# Patient Record
Sex: FEMALE | Race: OTHER | Employment: OTHER | URBAN - METROPOLITAN AREA
[De-identification: names, ages, dates, MRNs, and addresses within clinical notes are randomized per-mention and may not be internally consistent; named-entity substitution may affect disease eponyms.]

---

## 2019-07-19 ENCOUNTER — APPOINTMENT (OUTPATIENT)
Dept: PHYSICAL THERAPY | Age: 59
End: 2019-07-19
Payer: MEDICARE

## 2019-08-02 ENCOUNTER — HOSPITAL ENCOUNTER (OUTPATIENT)
Dept: PHYSICAL THERAPY | Age: 59
Discharge: HOME | End: 2019-08-02
Payer: MEDICARE

## 2019-08-02 PROCEDURE — 97110 THERAPEUTIC EXERCISES: CPT | Performed by: PHYSICAL THERAPIST

## 2019-08-02 PROCEDURE — 97161 PT EVAL LOW COMPLEX 20 MIN: CPT | Performed by: PHYSICAL THERAPIST

## 2019-08-02 NOTE — PROGRESS NOTES
[facility] at [address] [place] [address] Drive: [phone]   Fax: [phone]  PLAN OF CARE / STATEMENT OF MEDICAL NECESSITY FOR PHYSICAL THERAPY SERVICES  Patient Name: Cecilia Jenkins : 1960   Medical   Diagnosis: Bilateral knee pain [M25.561, M25.562] Treatment Diagnosis: Bilateral knee pain   Onset Date: Chronic, worse since January     Referral Source: Dallas Finn MD Start of Care [facility]): 2019   Prior Hospitalization: See medical history Provider #: [id number]   Prior Level of Function: Cleaning around house, difficulty with stairs   Comorbidities: OA, MS, DMII   Medications: Verified on Patient Summary List   The Plan of Care and following information is based on the information from the initial evaluation.   ===========================================================================================  Assessment / pisano information:  Cecilia Jenkins is a 61 y.o.  yo female presents with  bilateral knee pain secondary to patellofemoral OA (right greater than left). Long standing history of MS, which may affect her strength.     Patient will continue to benefit from skilled PT services to modify and progress therapeutic interventions, address functional mobility deficits, address ROM deficits, address strength deficits, analyze and address soft tissue restrictions, analyze and cue movement patterns, analyze and modify body mechanics/ergonomics and assess and modify postural abnormalities to attain remaining goals.     Pt instructed in HEP and will f/u in clinic for PT.  ===========================================================================================  Eval Complexity: History HIGH Complexity :3+ comorbidities / personal factors will impact the outcome/ POC ;  Examination  MEDIUM Complexity : 3 Standardized tests and measures addressing body structure, function, activity limitation and / or participation in recreation ; Presentation LOW Complexity : Stable, uncomplicated ;  Decision Making MEDIUM Complexity : FOTO score of 26-74; Overall Complexity LOW   Problem List: pain affecting function, decrease ROM, decrease strength, impaired gait/ balance, decrease ADL/ functional abilitiies, decrease activity tolerance, decrease flexibility/ joint mobility and decrease transfer abilities   Treatment Plan may include any combination of the following: Therapeutic exercise, Therapeutic activities, Neuromuscular re-education, Physical agent/modality, Gait/balance training, Manual therapy, Aquatic therapy, Patient education, Self Care training, Functional mobility training and Home safety training  Patient / Family readiness to learn indicated by: asking questions, trying to perform skills and interest  Persons(s) to be included in education: patient (P)  Barriers to Learning/Limitations: no  Patient Goal (s): Make legs stronger   Patient self reported health status: fair  Rehabilitation Potential: fair  Goals:  Short Term Goals: To be accomplished in 2 weeks:   Patient will report compliance with HEP at least 1x/day to aid in rehabilitation program.   Status at IE: NA   Current: Same as IE     Long Term Goals: To be accomplished in 6 weeks:   Patient will increase strength to 5/5 throughout B LEs to aid in completion of ADLs. Status at IE:4/5 in right LE   Current: Same as IE     Patient will report pain less than 5/10 average to aid in completion of ADLs. Status at IE:6/10   Current: Same as IE     Patient will perform 10 pain free squats with 10lbs with good form/technique to aid in completion of ADLs. Status at IE:uses UE for sit<>Stand   Current: Same as IE     Patient will improve FOTO to 51 points overall to demonstrate improvement in functional ability.    Status at IE:45   Current: Same as IE       Frequency / Duration:   Patient to be seen 2  times per week for 12  weeks:  Patient / Caregiver education and instruction: self care and exercises      . Therapist Signature: Riki Monet DPT, OCS Date: 9/4/8444   Certification Period: 8/2/2019 - 10/25/2019 Time: 2:46 PM   ===========================================================================================  I certify that the above Physical Therapy Services are being furnished while the patient is under my care. I agree with the treatment plan and certify that this therapy is necessary. Physician Signature:        Date:       Time:     Please sign and return to [facility] [place] or you may fax the signed copy to [phone]. Thank you.

## 2019-08-02 NOTE — PROGRESS NOTES
PT DAILY TREATMENT NOTE/KNEE EVAL 10-18    Patient Name: Consuelo Bo  Date:2019  : 1960  [x]  Patient  Verified  Payor: Geri Celeste / Plan: [place] MED[place]ARE PART A & B / Product Type: Medicare /    In time:1:30  Out time:2:30  Total Treatment Time (min): 60  Visit #: 1 of 12    Medicare/BCBS Only   Total Timed Codes (min):  30 1:1 Treatment Time:  60     Treatment Area: Bilateral knee pain [M25.561, M25.562]    SUBJECTIVE  Pain Level (0-10 scale): 6  []constant []intermittent []improving []worsening []no change since onset    Any medication changes, allergies to medications, adverse drug reactions, diagnosis change, or new procedure performed?: [x] No    [] Yes (see summary sheet for update)  Subjective functional status/changes:     Patient has c/c of bilateral knee pain and leg pain for many years, but worsened in 2018. SHONDA: uncertain. Patient describes pain as sharp and intermittent, but otherwise achy and constant. No diurnal features. Reports numbness/tingling in B legs (entirely), and both arms. Denies popping/clicking. Aggravating factors: sit<>stand, walking, car transfers, stairs, . Alleviating factors: rest.  Reports lightheadedness (for over a year). Reports bladder issues related to MD.  Denies red flags: SOB, chest pain, dizziness, blurred/double vision, HA, chills/fevers, night sweats, change in bowel/bladder control, abdominal pain, difficulty swallowing, slurred speech, unexplained weight gain/loss, nausea, vomiting. PMHx: depression, MS (since ), DMII, hypercholesterolemia,  . Surgical Hx: left knee surgery debridement at 9yrs old, left knee debridement when in 25s. Hysterectomy (4 yrs), cholecystectomy (as teenager). Social Hx: 2level home, alcohol, tobacco, work status. PLOF: cleaning. CLOF: reduced. .  Diagnostic Imaging: x-rays: OA (Per referral)    OBJECTIVE/EXAMINATION    30 min []Eval                  []Re-Eval       30 min Therapeutic Exercise:  [] See flow sheet : Rationale: increase ROM, increase strength and decrease pain to improve the patients ability to complete ADLs        With   [] TE   [] TA   [] neuro   [] other: Patient Education: [x] Review HEP    [] Progressed/Changed HEP based on:   [] positioning   [] body mechanics   [] transfers   [] heat/ice application    [] other:        Physical Therapy Evaluation - Knee    Posture: [] Varus    [] Valgus    [] Recurvatum        [] Tibial Torsion    [] Foot Supination    [] Foot Pronation    Describe:    Gait:  [] Normal    [] Abnormal    [] Antalgic    [] NWB    Device:    Describe:    ROM / Strength  [] Unable to assess                  AROM                    Strength (1-5)    Left Right Left Right   Hip Flexion   4 4    Extension        Abduction        Adduction       Knee Flexion 120 125 4+ 4-    Extension 25 20 5 5   Ankle Plantarflexion   4+ 4+    Dorsiflexion             Palpation:   Neg/Pos  Neg/Pos  Neg/Pos   Joint Line neg Quad tendon neg Patellar ligament neg   Patella POS* Fibular head neg Pes Anserinus neg   Tibial tubercle neg Hamstring tendons neg Infrapatellar fat pad neg   *superiorlateral corner    Optional Tests:  Patellar Mobility   []L []R Hypermobile [x]L [x]R Hypomobile           Lachmans  [x] Neg    [] Pos Posterior Drawer [x] Neg    [] Pos    Squat   [x] Neg    [] Pos  Valgus@ 0 Degrees [x] Neg    [] Pos Eleazar-Ash [x] Neg    [] Pos  Valgus@ 30 Degrees [x] Neg    [] Pos Patellar Apprehension [x] Neg    [] Pos  Varus@ 0 Degrees [x] Neg    [] Pos   Varus@ 30 Degrees [x] Neg    [] Pos    Anterior Drawer [x] Neg    [] Pos   Thessaly's Test:                  [x] Neg    [] Pos     Bounce Home Test: negative         Other tests/comments:       Pain Level (0-10 scale) post treatment: 6    ASSESSMENT/Changes in Function: Patient presents with bilateral knee pain secondary to patellofemoral OA (right greater than left). Long standing history of MS, which may affect her strength.     Patient will continue to benefit from skilled PT services to modify and progress therapeutic interventions, address functional mobility deficits, address ROM deficits, address strength deficits, analyze and address soft tissue restrictions, analyze and cue movement patterns, analyze and modify body mechanics/ergonomics and assess and modify postural abnormalities to attain remaining goals.      []  See Plan of Care  []  See progress note/recertification  []  See Discharge Summary         Progress towards goals / Updated goals:  See POC    PLAN  []  Upgrade activities as tolerated     [x]  Continue plan of care  []  Update interventions per flow sheet       []  Discharge due to:_  []  Other:_      Chapin Queen, PT, DPT 8/2/2019  1:45 PM

## 2019-08-05 ENCOUNTER — APPOINTMENT (OUTPATIENT)
Dept: PHYSICAL THERAPY | Age: 59
End: 2019-08-05
Payer: MEDICARE

## 2019-08-06 ENCOUNTER — HOSPITAL ENCOUNTER (OUTPATIENT)
Dept: PHYSICAL THERAPY | Age: 59
Discharge: HOME | End: 2019-08-06
Payer: MEDICARE

## 2019-08-06 PROCEDURE — 97110 THERAPEUTIC EXERCISES: CPT | Performed by: PHYSICAL THERAPIST

## 2019-08-06 NOTE — PROGRESS NOTES
PT DAILY TREATMENT NOTE    Patient Name: Luiz Golden  Date:2019  : 1960  [x]  Patient  Verified  Payor: [place] MEDICARE / Plan: [place] MEDICARE PART A & B / Product Type: Medicare /    In time:7:30  Out time:8:10  Total Treatment Time (min): 40  Total Timed Codes (min): 40  1:1 Treatment Time ( [address] only): 40   Visit #: 2 of 12    Treatment Area: Bilateral knee pain [M25.561, M25.562]    SUBJECTIVE  Pain Level (0-10 scale): 6  Any medication changes, allergies to medications, adverse drug reactions, diagnosis change, or new procedure performed?: [x] No    [] Yes (see summary sheet for update)  Subjective functional status/changes:   [] No changes reported  Feels okay. Just tired because it's early    OBJECTIVE    40 min Therapeutic Exercise:  [x] See flow sheet :   Rationale: increase ROM, increase strength and decrease pain to improve the patients ability to complete ADLs       With   [] TE   [] TA   [] neuro   [] other: Patient Education: [x] Review HEP    [] Progressed/Changed HEP based on:   [] positioning   [] body mechanics   [] transfers   [] heat/ice application    [] other:      Other Objective/Functional Measures: NA     Pain Level (0-10 scale) post treatment: 6    ASSESSMENT/Changes in Function: Patient responds well to treatment session. Patient has minimal difficulty with exercises as prescribed. Progress as tolerated.  No adverse effects were noted from today's treatment session       Patient will continue to benefit from skilled PT services to modify and progress therapeutic interventions, address functional mobility deficits, address ROM deficits, address strength deficits, analyze and address soft tissue restrictions, analyze and cue movement patterns, analyze and modify body mechanics/ergonomics, assess and modify postural abnormalities    []  See Plan of Care  []  See progress note/recertification  []  See Discharge Summary         Progress towards goals / Updated goals:  Short Term Goals: To be accomplished in 2 weeks:                   Patient will report compliance with HEP at least 1x/day to aid in rehabilitation program.                   Status at IE: NA                   Current: Same as IE     Long Term Goals: To be accomplished in 6 weeks:                   Patient will increase strength to 5/5 throughout B LEs to aid in completion of ADLs. Status at IE:4/5 in right LE                   Current: Same as IE                      Patient will report pain less than 5/10 average to aid in completion of ADLs. Status at IE:6/10                   Current: Same as IE                      Patient will perform 10 pain free squats with 10lbs with good form/technique to aid in completion of ADLs. Status at IE:uses UE for sit<>Stand                   Current: Same as IE                      Patient will improve FOTO to 51 points overall to demonstrate improvement in functional ability.                    Status at IE:45                   Current: Same as IE    PLAN  []  Upgrade activities as tolerated     [x]  Continue plan of care  []  Update interventions per flow sheet       []  Discharge due to:_  []  Other:_      Makayla Mesa, PT, DPT 8/6/2019  7:36 AM    Future Appointments   Date Time Provider Eddy Flores   8/7/2019  8:00 AM Mackenzie Abrams, PT, DPT [facility]

## 2019-08-07 ENCOUNTER — APPOINTMENT (OUTPATIENT)
Dept: PHYSICAL THERAPY | Age: 59
End: 2019-08-07
Payer: MEDICARE

## 2019-08-09 ENCOUNTER — HOSPITAL ENCOUNTER (OUTPATIENT)
Dept: PHYSICAL THERAPY | Age: 59
Discharge: HOME | End: 2019-08-09
Payer: MEDICARE

## 2019-08-09 PROCEDURE — 97110 THERAPEUTIC EXERCISES: CPT | Performed by: PHYSICAL THERAPIST

## 2019-08-09 NOTE — PROGRESS NOTES
PT DAILY TREATMENT NOTE    Patient Name: Lucinda James  Date:2019  : 1960  [x]  Patient  Verified  Payor: Salvatore Castle / Plan: [place] MEDICARE PART A & B / Product Type: Medicare /    In time:3:00  Out time:3:35  Total Treatment Time (min): 35  Total Timed Codes (min): 35  1:1 Treatment Time ( [address] only): 35   Visit #: 3 of 12    Treatment Area: Bilateral knee pain [M25.561, M25.562]    SUBJECTIVE  Pain Level (0-10 scale): 4  Any medication changes, allergies to medications, adverse drug reactions, diagnosis change, or new procedure performed?: [x] No    [] Yes (see summary sheet for update)  Subjective functional status/changes:   [] No changes reported  Feels alright just hot outside. OBJECTIVE    35 min Therapeutic Exercise:  [x] See flow sheet :   Rationale: increase ROM, increase strength and decrease pain to improve the patients ability to complete ADLs    With   [] TE   [] TA   [] neuro   [] other: Patient Education: [x] Review HEP    [] Progressed/Changed HEP based on:   [] positioning   [] body mechanics   [] transfers   [] heat/ice application    [] other:      Other Objective/Functional Measures: NA     Pain Level (0-10 scale) post treatment: 4    ASSESSMENT/Changes in Function: Patient responds well to treatment session. Patient has minimal difficulty with exercises as prescribed. Progress as tolerated. . No adverse effects were noted from today's treatment session       Patient will continue to benefit from skilled PT services to modify and progress therapeutic interventions, address functional mobility deficits, address ROM deficits, address strength deficits, analyze and address soft tissue restrictions, analyze and cue movement patterns, analyze and modify body mechanics/ergonomics, assess and modify postural abnormalities    []  See Plan of Care  []  See progress note/recertification  []  See Discharge Summary         Progress towards goals / Updated goals:  Short Term Goals: To be accomplished in 2 weeks:                   Patient will report compliance with HEP at least 1x/day to aid in rehabilitation program.                   Status at IE: NA                   Current: Reports compliance with HEP 8/9/2019     Long Term Goals: To be accomplished in 6 weeks:                   IQQERAH will increase strength to 5/5 throughout B LEs to aid in completion of ADLs.                  Status at IE:4/5 in right LE                   Current: Same as IE                      Patient will report pain less than 5/10 average to aid in completion of ADLs.                  Status at IE:6/10                   Current: Same as IE                      Patient will perform 10 pain free squats with 10lbs with good form/technique to aid in completion of ADLs.                  Status at IE:uses UE for sit<>Stand                   Current: Same as IE                      Patient will improve FOTO to 51 points overall to demonstrate improvement in functional ability.                    Status at IE:45                   Current: Same as IE    PLAN  []  Upgrade activities as tolerated     [x]  Continue plan of care  []  Update interventions per flow sheet       []  Discharge due to:_  []  Other:_      David Knott PT, DPT 8/9/2019  3:06 PM    Future Appointments   Date Time Provider Eddy Flores   8/13/2019  3:00 PM Hasmukh [facility]   8/16/2019  3:30 PM [facility] PT [facility]   8/19/2019  3:30 PM JIGAR Krishna [facility]   8/21/2019  3:00 PM Danika Horne PT AR [facility]   8/27/2019  3:30 PM [facility]   8/30/2019 10:00 AM [facility] PT [facility]

## 2019-08-13 ENCOUNTER — HOSPITAL ENCOUNTER (OUTPATIENT)
Dept: PHYSICAL THERAPY | Age: 59
Discharge: HOME | End: 2019-08-13
Payer: MEDICARE

## 2019-08-13 PROCEDURE — 97110 THERAPEUTIC EXERCISES: CPT

## 2019-08-13 NOTE — PROGRESS NOTES
PT DAILY TREATMENT NOTE    Patient Name: Migue Beck  Date:2019  : 1960  [x]  Patient  Verified  Payor: Yosef Penny / Plan: [place] MEDICARE PART A & B / Product Type: Medicare /    In time:305  Out time:357  Total Treatment Time (min): 52  Total Timed Codes (min): 47  1:1 Treatment Time ( [address] only): 52   Visit #: 4 of 12    Treatment Area: Bilateral knee pain [M25.561, M25.562]    SUBJECTIVE  Pain Level (0-10 scale): 5  Any medication changes, allergies to medications, adverse drug reactions, diagnosis change, or new procedure performed?: [x] No    [] Yes (see summary sheet for update)  Subjective functional status/changes:   [] No changes reported  Patient reports MS has been \"acting up\" with increased painful paresthesias/burning sensation in LEs interfering with ability to sleep and also resulting in increased right knee pain. Patient states she is hesitant to push exs hard today as she feels fatigued and is concerned increased exs may exacerbate MS symptoms. OBJECTIVE      47 min Therapeutic Exercise:  [x] See flow sheet :   Rationale: increase ROM, increase strength and decrease pain to improve the patients ability to complete ADLs      With   [x] TE   [] TA   [] neuro   [] other: Patient Education: [x] Review HEP    [] Progressed/Changed HEP based on:   [] positioning   [] body mechanics   [] transfers   [] heat/ice application    [] other:      Other Objective/Functional Measures: NA     Pain Level (0-10 scale) post treatment: 6    ASSESSMENT/Changes in Function: Patient responds well to treatment session reporting only slight increase in pain with careful progression of exercises. Pt declined cryotherapy post treatment. Added terminal knee extension exercise to program as it was noted pt was ambulating with knees slightly flexed today, which increases patellofemoral load.  Educated pt on working to ambulate with more knee extension in stance phase right LE to decrease patellofemoral load.    Patient will continue to benefit from skilled PT services to modify and progress therapeutic interventions, address functional mobility deficits, address ROM deficits, address strength deficits, analyze and address soft tissue restrictions, analyze and cue movement patterns, analyze and modify body mechanics/ergonomics, assess and modify postural abnormalities to attain remaining goals. [x]  See Plan of Care  []  See progress note/recertification  []  See Discharge Summary         Progress towards goals / Updated goals:  Short Term Goals: To be accomplished in 2 weeks:                   TAGDIFL will report compliance with HEP at least 1x/day to aid in rehabilitation program.                   Status at IE: NA                   Current: Reports compliance with HEP 8/9/2019     Long Term Goals: To be accomplished in 6 weeks:                   FGZLAUS will increase strength to 5/5 throughout B LEs to aid in completion of ADLs.                  Status at IE:4/5 in right LE                   Current: Same as IE                      Patient will report pain less than 5/10 average to aid in completion of ADLs.                  Status at IE:6/10                   Current: Same as IE                      Patient will perform 10 pain free squats with 10lbs with good form/technique to aid in completion of ADLs.                  Status at IE:uses UE for sit<>Stand                   Current: Now able to perform squats 2 sets of 10 with 0 lbs. 8/13/19.                      Patient will improve FOTO to 51 points overall to demonstrate improvement in functional ability.                    Status at IE:45                   Current: Same as IE  PLAN  [x]  Upgrade activities as tolerated     [x]  Continue plan of care  []  Update interventions per flow sheet       []  Discharge due to:_  []  Other:_      Laurel Collado, PT 8/13/2019  3:21 PM    Future Appointments   Date Time Provider Eddy Flores   8/16/2019  3:30 PM [facility] PT LISHA [facility]   8/19/2019  3:30 PM JIGAR Rodríguez [facility]   8/21/2019  3:00 PM JIGAR Rodríguez [facility]   8/27/2019  3:30 PM [facility] PT LISHA PELAYOTJACKY [facility]   8/30/2019 10:00 AM [facility] PT [facility]ORY [facility]

## 2019-08-16 ENCOUNTER — APPOINTMENT (OUTPATIENT)
Dept: PHYSICAL THERAPY | Age: 59
End: 2019-08-16
Payer: MEDICARE

## 2019-08-19 ENCOUNTER — HOSPITAL ENCOUNTER (OUTPATIENT)
Dept: PHYSICAL THERAPY | Age: 59
Discharge: HOME | End: 2019-08-19
Payer: MEDICARE

## 2019-08-19 PROCEDURE — 97110 THERAPEUTIC EXERCISES: CPT

## 2019-08-19 NOTE — PROGRESS NOTES
PT DAILY TREATMENT NOTE - [facility]     Patient Name: Karen Barraza  Date:2019  : 1960  [x]  Patient  Verified  Payor: Kaleb Olivoyangbarney / Plan: [place] MED[place] & B / Product Type: Medicare /    In time:330  Out time:415  Total Treatment Time (min): 45  Total Timed Codes (min): 45   1:1 Treatment Time ([address] only): 39   Visit #: 5 of 12    Treatment Area: Bilateral knee pain [M25.561, M25.562]    SUBJECTIVE  Pain Level (0-10 scale): 6  Any medication changes, allergies to medications, adverse drug reactions, diagnosis change, or new procedure performed?: [x] No    [] Yes (see summary sheet for update)  Subjective functional status/changes:   [] No changes reported  Patient reports that she is sore as [place] She states that she used a scooter to get around the park. OBJECTIVE    45 min Therapeutic Exercise:  [x] See flow sheet :   Rationale: increase ROM, increase strength and decrease pain to improve the patients ability to complete ADLs          With   [x] TE   [] TA   [] neuro   [] other: Patient Education: [x] Review HEP    [] Progressed/Changed HEP based on:   [] positioning   [] body mechanics   [] transfers   [] heat/ice application    [] other:      Other Objective/Functional Measures: NA     Pain Level (0-10 scale) post treatment: 5    ASSESSMENT/Changes in Function: Patient responds well to treatment session as she reported a reduction in symptoms. Patient required fewer cues to focus on knee extension when ambulating in the clinic. No adverse effects were noted from today's treatment session.  Patient will continue to benefit from skilled PT services to modify and progress therapeutic interventions, address functional mobility deficits, address ROM deficits, address strength deficits, analyze and address soft tissue restrictions, analyze and cue movement patterns, analyze and modify body mechanics/ergonomics, assess and modify postural abnormalities,  instruct in home and community integration to attain remaining goals. []  See Plan of Care  []  See progress note/recertification  []  See Discharge Summary         Progress towards goals / Updated goals:  Short Term Goals: To be accomplished in 2 weeks:                   ZMNPQSH will report compliance with HEP at least 1x/day to aid in rehabilitation program.                   Status at IE: NA                   Current: Met 08/19/2019     Long Term Goals: To be accomplished in 6 weeks:                   LFFVGNR will increase strength to 5/5 throughout B LEs to aid in completion of ADLs.                  Status at IE:4/5 in right LE                   Current: Same as IE                      Patient will report pain less than 5/10 average to aid in completion of ADLs.                  Status at IE:6/10                   Current: Same as IE                      Patient will perform 10 pain free squats with 10lbs with good form/technique to aid in completion of ADLs.                  Status at IE:uses UE for sit<>Stand                   Current: Now able to perform squats 2 sets of 10 with 0 lbs. 8/13/19.                      Patient will improve FOTO to 51 points overall to demonstrate improvement in functional ability.                    Status at IE:45                   Current: Same as IE    PLAN  []  Upgrade activities as tolerated     [x]  Continue plan of care  []  Update interventions per flow sheet       []  Discharge due to:_  []  Other:_      Angeli Doll PT, DPT 8/19/2019  3:25 PM    Future Appointments   Date Time Provider Eddy Flores   8/19/2019  3:30 PM JIGAR Arias THE [facility]   8/21/2019  3:00 PM JIGAR AriasHPTJACKY THE [facility]   8/23/2019  2:30 PM Mackenzie Abrams PT, DPT MIHPTJACKY THE [facility]   8/27/2019  3:30 PM JIGAR Alicea THE [facility]   8/30/2019 10:00 AM JIGAR Alicea THE [facility]

## 2019-08-21 ENCOUNTER — HOSPITAL ENCOUNTER (OUTPATIENT)
Dept: PHYSICAL THERAPY | Age: 59
Discharge: HOME | End: 2019-08-21
Payer: MEDICARE

## 2019-08-21 PROCEDURE — 97110 THERAPEUTIC EXERCISES: CPT

## 2019-08-21 NOTE — PROGRESS NOTES
PT DAILY TREATMENT NOTE - [facility]     Patient Name: Alen Roper  Date:2019  : 1960  [x]  Patient  Verified  Payor: Nick Cluster / Plan: [place] MEDICARE PART A & B / Product Type: Medicare /    In time:300  Out time:345  Total Treatment Time (min): 45  Total Timed Codes (min): 45   1:1 Treatment Time ([address] only): 39   Visit #: 6 of 12    Treatment Area: Bilateral knee pain [M25.561, M25.562]    SUBJECTIVE  Pain Level (0-10 scale): 6  Any medication changes, allergies to medications, adverse drug reactions, diagnosis change, or new procedure performed?: [x] No    [] Yes (see summary sheet for update)  Subjective functional status/changes:   [] No changes reported  Patient reports that her knees are sore today as she usually has two days between visits. OBJECTIVE    45 min Therapeutic Exercise:  [x] See flow sheet :   Rationale: increase ROM, increase strength and decrease pain to improve the patients ability to complete ADLs          With   [x] TE   [] TA   [] neuro   [] other: Patient Education: [x] Review HEP    [] Progressed/Changed HEP based on:   [] positioning   [] body mechanics   [] transfers   [] heat/ice application    [] other:      Other Objective/Functional Measures: NA     Pain Level (0-10 scale) post treatment: 6    ASSESSMENT/Changes in Function: Patient responds well to treatment session. Provided cues for sequencing with step activities. Patient also required cues to avoid trunk compensatory strategies with hip strengthening activities.  No adverse effects were noted from today's treatment session    Patient will continue to benefit from skilled PT services to modify and progress therapeutic interventions, address functional mobility deficits, address ROM deficits, address strength deficits, analyze and address soft tissue restrictions, analyze and cue movement patterns, analyze and modify body mechanics/ergonomics, assess and modify postural abnormalities, instruct in home and community integration to attain remaining goals. []  See Plan of Care  []  See progress note/recertification  []  See Discharge Summary         Progress towards goals / Updated goals:  Short Term Goals: To be accomplished in 2 weeks:                   QMXHTFY will report compliance with HEP at least 1x/day to aid in rehabilitation program.                   Status at IE: NA                   Current: Met 08/19/2019     Long Term Goals: To be accomplished in 6 weeks:                   ZZDTEBB will increase strength to 5/5 throughout B LEs to aid in completion of ADLs.                  Status at IE:4/5 in right LE                   Current: Same as IE                      Patient will report pain less than 5/10 average to aid in completion of ADLs.                  Status at IE:6/10                   Current: Same as IE                      Patient will perform 10 pain free squats with 10lbs with good form/technique to aid in completion of ADLs.                  Status at IE:uses UE for sit<>Stand                   Current: Now able to perform squats 2 sets of 10 with 0 lbs. 8/13/19.                      Patient will improve FOTO to 51 points overall to demonstrate improvement in functional ability.                    Status at IE:45                   Current: Same as IE    PLAN  []  Upgrade activities as tolerated     [x]  Continue plan of care  []  Update interventions per flow sheet       []  Discharge due to:_  []  Other:_      Hernandez Plascencia, PT, DPT 8/21/2019  3:12 PM    Future Appointments   Date Time Provider Eddy Flores   8/23/2019  2:30 PM Remedios Moses PT, DPT MIHPTVSADIQ [facility]   8/27/2019  3:30 PM Karena Lopez THE [facility]   8/30/2019 10:00 AM JIGAR HollowayHPDREAD THE [facility]

## 2019-08-23 ENCOUNTER — HOSPITAL ENCOUNTER (OUTPATIENT)
Dept: PHYSICAL THERAPY | Age: 59
Discharge: HOME | End: 2019-08-23
Payer: MEDICARE

## 2019-08-23 PROCEDURE — 97110 THERAPEUTIC EXERCISES: CPT | Performed by: PHYSICAL THERAPIST

## 2019-08-23 NOTE — PROGRESS NOTES
PT DAILY TREATMENT NOTE    Patient Name: Enedina Freire  Date:2019  : 1960  [x]  Patient  Verified  Payor: Mayank Pedraza / Plan: [place] MEDICARE PART A & B / Product Type: Medicare /    In time:2:30  Out time:3:17  Total Treatment Time (min): 47  Total Timed Codes (min): 47  1:1 Treatment Time ( [address] only): 40  Visit #: 6 of 12    Treatment Area: Bilateral knee pain [M25.561, M25.562]    SUBJECTIVE  Pain Level (0-10 scale): 7  Any medication changes, allergies to medications, adverse drug reactions, diagnosis change, or new procedure performed?: [x] No    [] Yes (see summary sheet for update)  Subjective functional status/changes:   [] No changes reported  Feels more pain today. Has been to the clinic 3 days this week and can feel it. OBJECTIVE    40 min Therapeutic Exercise:  [x] See flow sheet :   Rationale: increase ROM, increase strength and decrease pain to improve the patients ability to complete ADLs       With   [] TE   [] TA   [] neuro   [] other: Patient Education: [x] Review HEP    [] Progressed/Changed HEP based on:   [] positioning   [] body mechanics   [] transfers   [] heat/ice application    [] other:      Other Objective/Functional Measures: NA     Pain Level (0-10 scale) post treatment: 6    ASSESSMENT/Changes in Function: Patient responds well to treatment session. Patient continues to progress well. Minimal difficulty with exercises as prescribed.  . No adverse effects were noted from today's treatment session    Patient will continue to benefit from skilled PT services to modify and progress therapeutic interventions, address functional mobility deficits, address ROM deficits, address strength deficits, analyze and address soft tissue restrictions, analyze and cue movement patterns, analyze and modify body mechanics/ergonomics, assess and modify postural abnormalities    []  See Plan of Care  []  See progress note/recertification  []  See Discharge Summary         Progress towards goals / Updated goals:  Short Term Goals: To be accomplished in 2 weeks:                   UVTAPPD will report compliance with HEP at least 1x/day to aid in rehabilitation program.                   Status at IE: NA                   Current: Met 08/19/2019     Long Term Goals: To be accomplished in 6 weeks:                   SATXAKR will increase strength to 5/5 throughout B LEs to aid in completion of ADLs.                  Status at IE:4/5 in right LE                   Current: Same as IE                      Patient will report pain less than 5/10 average to aid in completion of ADLs.                  Status at IE:6/10                   Current: Pain still 6-7/10, progressing 8/23/2019                      Patient will perform 10 pain free squats with 10lbs with good form/technique to aid in completion of ADLs.                  Status at IE:uses UE for sit<>Stand                   Current: Now able to perform squats 2 sets of 10 with 0 lbs. 8/13/19.                      Patient will improve FOTO to 51 points overall to demonstrate improvement in functional ability.                    Status at IE:45                   Current: Same as IE    PLAN  []  Upgrade activities as tolerated     [x]  Continue plan of care  []  Update interventions per flow sheet       []  Discharge due to:_  []  Other:_      Kimmy Montero PT, DPT 8/23/2019  2:32 PM    Future Appointments   Date Time Provider Eddy Flores   8/27/2019  3:30 PM Noemi Dunn, PT MIHPTVY THE [facility]   8/30/2019 10:00 AM Noemi Dunn PT [facility]   9/4/2019 10:30 AM Shanita Nicole PT [facility]PTV[facility]   9/6/2019  1:30 PM Miguel Simms, PT, DPT [facility]PT[facility]   9/9/2019 11:30 AM Miguel Simms, PT, DPT [facility]   9/11/2019 10:30 AM Miguel Simms, PT, DPT [facility]   9/16/2019 10:00 AM Miguel Simms, PT, DPT MI[facility]PTV[facility]   9/18/2019 10:00 AM Miguel Simms, PT, DPT [facility]

## 2019-08-27 ENCOUNTER — HOSPITAL ENCOUNTER (OUTPATIENT)
Dept: PHYSICAL THERAPY | Age: 59
Discharge: HOME | End: 2019-08-27
Payer: MEDICARE

## 2019-08-27 PROCEDURE — 97110 THERAPEUTIC EXERCISES: CPT

## 2019-08-27 NOTE — PROGRESS NOTES
PT DAILY TREATMENT NOTE    Patient Name: Ramone Whitney  Date:2019  : 1960  [x]  Patient  Verified  Payor: Alta Mean / Plan: [place] MEDICARE PART A & B / Product Type: Medicare /    In time:330  Out time:421  Total Treatment Time (min): Total Timed Codes (min): 45  1:1 Treatment Time ( only): 35   Visit #: 8 of 12    Treatment Area: Bilateral knee pain [M25.561, M25.562]    SUBJECTIVE  Pain Level (0-10 scale): 4  Any medication changes, allergies to medications, adverse drug reactions, diagnosis change, or new procedure performed?: [x] No    [] Yes (see summary sheet for update)  Subjective functional status/changes:   [] No changes reported  \"Pain is a bit less today, but I am still having buckling of right knee sometimes, which really bothers me. I am also having trouble sleeping and the doctor wants me to start the Ambien again. \"    OBJECTIVE    35 min Therapeutic Exercise:  [x] See flow sheet :   Rationale: increase ROM, increase strength and decrease pain to improve the patients ability to complete ADLs       With   [] TE   [] TA   [] neuro   [] other: Patient Education: [x] Review HEP    [] Progressed/Changed HEP based on:   [] positioning   [] body mechanics   [] transfers   [] heat/ice application    [] other:      Other Objective/Functional Measures: NA     Pain Level (0-10 scale) post treatment: 4    ASSESSMENT/Changes in Function: Patient responds well to treatment session. Pt moderately fatigued with exercises, requiring occasional rest periods, but no increase in pain.  No adverse effects were noted from today's treatment session       Patient will continue to benefit from skilled PT services to modify and progress therapeutic interventions, address functional mobility deficits, address ROM deficits, address strength deficits, analyze and address soft tissue restrictions, analyze and cue movement patterns, analyze and modify body mechanics/ergonomics, assess and modify postural abnormalities,  and instruct in home and community integration to attain remaining goals. []  See Plan of Care  []  See progress note/recertification  []  See Discharge Summary         Progress towards goals / Updated goals:  Short Term Goals: To be accomplished in 2 weeks:                   RAFAELQMARCI will report compliance with HEP at least 1x/day to aid in rehabilitation program.                   Status at IE: NA                   Current: Met 08/19/2019     Long Term Goals: To be accomplished in 6 weeks:                   RZUSOKW will increase strength to 5/5 throughout B LEs to aid in completion of ADLs.                  Status at IE:4/5 in right LE                   Current: Same as IE                      Patient will report pain less than 5/10 average to aid in completion of ADLs.                  Status at IE:6/10                   Current: Pain reduced to 4/10 today, progressing 8/26/2019                      Patient will perform 10 pain free squats with 10lbs with good form/technique to aid in completion of ADLs.                  Status at IE:uses UE for sit<>Stand                   Current: Now able to perform squats 2 sets of 10 with 6 lbs. 8/27/19.                      Patient will improve FOTO to 51 points overall to demonstrate improvement in functional ability.                    Status at IE:45                   Current: Same as IE    PLAN  []  Upgrade activities as tolerated     [x]  Continue plan of care  []  Update interventions per flow sheet       []  Discharge due to:_  []  Other:_      Palma Alford PT, DPT 8/27/2019  3:35 PM    Future Appointments   Date Time Provider Eddy Flores   8/30/2019  3:00 PM Keyanna Scanlon THE [facility]   9/4/2019  3:00 PM JIGAR Roman THE [facility]   9/6/2019  1:30 PM Marcin Humphries PT, DPT MIHPTJACKY THE [facility]   9/9/2019  3:30 PM JIGAR Roman THE [facility]   9/11/2019  2:30 PM Marcin Humphries PT, DPT [facility]PTJACKY THE [facility]   9/16/2019  3:30 PM Marcin Humphries PT, CRAIG MIHPTVY THE [facility]   9/18/2019  2:00 PM Madyson Joseph PT, CRAIG MIHPTJACKY THE [facility]

## 2019-08-30 ENCOUNTER — HOSPITAL ENCOUNTER (OUTPATIENT)
Dept: PHYSICAL THERAPY | Age: 59
Discharge: HOME | End: 2019-08-30
Payer: MEDICARE

## 2019-08-30 PROCEDURE — 97110 THERAPEUTIC EXERCISES: CPT

## 2019-08-30 NOTE — PROGRESS NOTES
PT DAILY TREATMENT NOTE    Patient Name: Elizabeth Serrano  Date:2019  : 1960  [x]  Patient  Verified  Payor: VA MEDICARE / Plan: [place] MEDICARE PART A & B / Product Type: Medicare /    In time:255  Out time:341  Total Treatment Time (min): 41  Total Timed Codes (min): 31  1:1 Treatment Time ([facility] only): 312   Visit #: 9 of 12    Treatment Area: Bilateral knee pain [M25.561, M25.562]    SUBJECTIVE  Pain Level (0-10 scale): 4  Any medication changes, allergies to medications, adverse drug reactions, diagnosis change, or new procedure performed?: [x] No    [] Yes (see summary sheet for update)  Subjective functional status/changes:   [] No changes reported  \"My right knee gave out on me just a half hour ago. It is this occasional giving out of my right knee that worries me the most. I almost fell when it happened. \"    OBJECTIVE      31 min Therapeutic Exercise:  [x] See flow sheet :   Rationale: increase ROM, increase strength and decrease pain to improve the patients ability to complete ADLs    Rationale: To improve ambulation safety and efficiency in order to improve patient's ability to safely ambulate at home for self care. With   [] TE   [] TA   [] neuro   [] other: Patient Education: [x] Review HEP    [] Progressed/Changed HEP based on:   [] positioning   [] body mechanics   [] transfers   [] heat/ice application    [] other:      Other Objective/Functional Measures: NA     Pain Level (0-10 scale) post treatment: 4    ASSESSMENT/Changes in Function: Added swiss ball to bridge exercise to focus on increasing stability training of right knee. Laxity of MCL/LCL and ACL may be related to giving out indicating that further stabilization training is warranted. . No adverse effects were noted from today's treatment session.     Patient will continue to benefit from skilled PT services to modify and progress therapeutic interventions, address functional mobility deficits, address ROM deficits, address strength deficits, analyze and address soft tissue restrictions, analyze and cue movement patterns, analyze and modify body mechanics/ergonomics, assess and modify postural abnormalities,  and instruct in home and community integration to attain remaining goals. []  See Plan of Care  []  See progress note/recertification  []  See Discharge Summary         Progress towards goals / Updated goals:  Short Term Goals: To be accomplished in 2 weeks:                   WKDKIUL will report compliance with HEP at least 1x/day to aid in rehabilitation program.                   Status at IE: NA                   Current: Met 08/19/2019     Long Term Goals: To be accomplished in 6 weeks:                   QAJLSQS will increase strength to 5/5 throughout B LEs to aid in completion of ADLs.                  Status at IE:4/5 in right LE                   Current: Same as IE                      Patient will report pain less than 5/10 average to aid in completion of ADLs.                  Status at IE:6/10                   Current: Pain currently 3-4/10, progressing 8/30/2019                      Patient will perform 10 pain free squats with 10lbs with good form/technique to aid in completion of ADLs.                  Status at IE:uses UE for sit<>Stand                   Current: Now able to perform squats 2 sets of 10 with 0 lbs. 8/13/19.                      Patient will improve FOTO to 51 points overall to demonstrate improvement in functional ability.                    Status at IE:45                   Current: Same as IE    PLAN  []  Upgrade activities as tolerated     [x]  Continue plan of care  []  Update interventions per flow sheet       []  Discharge due to:_  []  Other:_      Palma Alford PT, DPT 8/30/2019  3:23 PM    Future Appointments   Date Time Provider Eddy Flores   9/4/2019  3:00 PM Carito Nix PT [facility]PTJACKY [facility]   9/6/2019  1:30 PM Marcin Humphries PT, DPT [facility]PDREAD [facility]   9/9/2019  3:30 PM Ana Alonzo JIGAR MasTJACKY THE [facility]   9/11/2019  2:30 PM Satya Richter PT, CRAIG JOYNER [facility]   9/16/2019  3:30 PM Satya Richter PT, CRAIG OGHPTJACKY [facility]   9/18/2019  2:00 PM Satya Richter PT, TOÑAT MIHPTJACKY THE [facility]

## 2019-09-04 ENCOUNTER — APPOINTMENT (OUTPATIENT)
Dept: PHYSICAL THERAPY | Age: 59
End: 2019-09-04
Payer: MEDICARE

## 2019-09-06 ENCOUNTER — HOSPITAL ENCOUNTER (OUTPATIENT)
Dept: PHYSICAL THERAPY | Age: 59
Discharge: HOME | End: 2019-09-06
Payer: MEDICARE

## 2019-09-06 PROCEDURE — 97110 THERAPEUTIC EXERCISES: CPT | Performed by: PHYSICAL THERAPIST

## 2019-09-06 NOTE — PROGRESS NOTES
[facility] at [address] [place] [address] Drive: [phone]   Fax: [phone]  Progress Note  Patient Name: Cecilia Jenkins : 1960   Medical   Diagnosis: Bilateral knee pain [M25.561, M25.562] Treatment Diagnosis: Bilateral knee pain   Onset Date: chronic     Referral Source: Dallas Finn MD Start of Care [facility]): 2019   Prior Hospitalization: See medical history Provider #: [id number]   Prior Level of Function: Cleaning around the house   Comorbidities: OA, MS, DMII   Medications: Verified on Patient Summary List      ===========================================================================================  Assessment / Summary of Care:  Cecilia Jenkins is a 61 y.o.  yo female with chronic bilateral knee pain. Patient is progressing well. Minimal difficulty with exercises as prescribed, but still has pain with prolonged ambulation. . No adverse effects were noted from today's treatment session. Patient will continue to benefit from skilled PT services to modify and progress therapeutic interventions, address functional mobility deficits, address ROM deficits, address strength deficits, analyze and address soft tissue restrictions, analyze and cue movement patterns, analyze and modify body mechanics/ergonomics, assess and modify postural abnormalities     ===========================================================================================    Plan:Continue therapy per initial plan/protocol at a frequency of  2 x per week for 4 weeks    Goals:   Short Term Goals: To be accomplished in 2 weeks:                   Patient will report compliance with HEP at least 1x/day to aid in rehabilitation program.                   Status at IE: NA                   Current: Met 2019     Long Term Goals: To be accomplished in 6 weeks:                   UZEHPCW will increase strength to 5/5 throughout B LEs to aid in completion of ADLs.                    Status at IE:4/5 in right LE                   Current: 5/5, MEt 9/6/2019                      Patient will report pain less than 5/10 average to aid in completion of ADLs.                  Status at IE:6/10                   Current: Pain currently 3-4/10, progressing 8/30/2019                      Patient will perform 10 pain free squats with 10lbs with good form/technique to aid in completion of ADLs.                  Status at IE:uses UE for sit<>Stand                   Current: Now able to perform squats 2 sets of 10 with 4 lbs, Progressing 9/6/2019                      Patient will improve FOTO to 51 points overall to demonstrate improvement in functional ability.                  Status at IE:45                   Current: 48. Progressing 9/6/2019    ===========================================================================================  Subjective: feels good. Feels like she's making good progress      Objective: MMT 5/5    Therapist Signature: Khushi Calabrese, PT, DPT, OCS Date: 2/7/0792   Re-Certification:  Time: 2:47 PM       I certify that the above Therapy Services are being furnished while the patient is under my care. I agree with the treatment plan and certify that this therapy is necessary. [] I have read the above and request that my patient continue as recommended. [] I have read the above report and request that my patient continue therapy with the following changes/special instructions: ______________________________________  [] I have read the above report and request that my patient be discharged from therapy    Physician's Signature:____________Date:_________TIME:________    ** Signature, Date and Time must be completed for valid certification **    [facility] at [address]         Phone: [phone]   Fax: [phone]  .

## 2019-09-06 NOTE — PROGRESS NOTES
PT DAILY TREATMENT NOTE    Patient Name: Elsa Batista  Date:2019  : 1960  [x]  Patient  Verified  Payor: [place] MEDICARE / Plan: [place] MEDICARE PART A & B / Product Type: Medicare /    In time:1:30  Out time: 2:25  Total Treatment Time (min): 655  Total Timed Codes (min): 55  1:1 Treatment Time ( [address] only): 54   Visit #: 10 of 12    Treatment Area: Bilateral knee pain [M25.561, M25.562]    SUBJECTIVE  Pain Level (0-10 scale): 3  Any medication changes, allergies to medications, adverse drug reactions, diagnosis change, or new procedure performed?: [x] No    [] Yes (see summary sheet for update)  Subjective functional status/changes:   [] No changes reported  Feels alright. No new issues. OBJECTIVE    55 min Therapeutic Exercise:  [x] See flow sheet :   Rationale: increase ROM, increase strength and decrease pain to improve the patients ability to complete ADLs       With   [] TE   [] TA   [] neuro   [] other: Patient Education: [x] Review HEP    [] Progressed/Changed HEP based on:   [] positioning   [] body mechanics   [] transfers   [] heat/ice application    [] other:      Other Objective/Functional Measures: NA     Pain Level (0-10 scale) post treatment: 3    ASSESSMENT/Changes in Function: Patient responds well to treatment session. Patient is progressing well. Minimal difficulty with exercises as prescribed, but still has pain with prolonged ambulation. . No adverse effects were noted from today's treatment session       Patient will continue to benefit from skilled PT services to modify and progress therapeutic interventions, address functional mobility deficits, address ROM deficits, address strength deficits, analyze and address soft tissue restrictions, analyze and cue movement patterns, analyze and modify body mechanics/ergonomics, assess and modify postural abnormalities     []  See Plan of Care  []  See progress note/recertification  []  See Discharge Summary         Progress towards goals / Updated goals:  Short Term Goals: To be accomplished in 2 weeks:                   GYTQOKX will report compliance with HEP at least 1x/day to aid in rehabilitation program.                   Status at IE: NA                   Current: Met 08/19/2019     Long Term Goals: To be accomplished in 6 weeks:                   NRBJJKL will increase strength to 5/5 throughout B LEs to aid in completion of ADLs.                  Status at IE:4/5 in right LE                   Current: 5/5, MEt 9/6/2019                      Patient will report pain less than 5/10 average to aid in completion of ADLs.                  Status at IE:6/10                   Current: Pain currently 3-4/10, progressing 8/30/2019                      Patient will perform 10 pain free squats with 10lbs with good form/technique to aid in completion of ADLs.                  Status at IE:uses UE for sit<>Stand                   Current: Now able to perform squats 2 sets of 10 with 4 lbs, Progressing 9/6/2019                      Patient will improve FOTO to 51 points overall to demonstrate improvement in functional ability.                  Status at IE:45                   Current: 48.  Progressing 9/6/2019    PLAN  []  Upgrade activities as tolerated     [x]  Continue plan of care  []  Update interventions per flow sheet       []  Discharge due to:_  []  Other:_      Camelia Turcios PT, DPT 9/6/2019  1:39 PM    Future Appointments   Date Time Provider Eddy Flores   9/9/2019  3:30 PM Thor Chaney PT [facility]   9/11/2019  2:30 PM Mago Fair PT, DPT [facility]   9/16/2019  3:30 PM Mago Fair PT, DPT [facility]   9/18/2019  2:00 PM Mago Fair PT, DPT [facility]

## 2019-09-09 ENCOUNTER — HOSPITAL ENCOUNTER (OUTPATIENT)
Dept: PHYSICAL THERAPY | Age: 59
Discharge: HOME | End: 2019-09-09
Payer: MEDICARE

## 2019-09-09 PROCEDURE — 97110 THERAPEUTIC EXERCISES: CPT

## 2019-09-09 NOTE — PROGRESS NOTES
PT DAILY TREATMENT NOTE - [facility]     Patient Name: Diana Walton  Date:2019  : 1960  [x]  Patient  Verified  Payor: [place] MEDICARE / Plan: [place] MEDICARE PART A & B / Product Type: Medicare /    In time:330  Out time:405  Total Treatment Time (min): 35  Total Timed Codes (min): 35   1:1 Treatment Time ( [address] only): 35   Visit #: 11 of 12    Treatment Area: Bilateral knee pain [M25.561, M25.562]    SUBJECTIVE  Pain Level (0-10 scale): 4  Any medication changes, allergies to medications, adverse drug reactions, diagnosis change, or new procedure performed?: [x] No    [] Yes (see summary sheet for update)  Subjective functional status/changes:   [] No changes reported  Patient reports that she feels like she has made significant improvement but has occasional knee buckling. OBJECTIVE    30 min Therapeutic Exercise:  [x] See flow sheet :   Rationale: increase ROM, increase strength and decrease pain to improve the patients ability to complete ADLs          With   [x] TE   [] TA   [] neuro   [] other: Patient Education: [x] Review HEP    [] Progressed/Changed HEP based on:   [] positioning   [] body mechanics   [] transfers   [] heat/ice application    [] other:      Other Objective/Functional Measures: NA     Pain Level (0-10 scale) post treatment: 3    ASSESSMENT/Changes in Function: Patient responds well to treatment session. Provided cues for proper mechanics with step activities. She required cues for activity pacing to promote proper muscle recovery.  No adverse effects were noted from today's treatment session      Patient will continue to benefit from skilled PT services to modify and progress therapeutic interventions, address functional mobility deficits, address ROM deficits, address strength deficits, analyze and address soft tissue restrictions, analyze and cue movement patterns, analyze and modify body mechanics/ergonomics, assess and modify postural abnormalities, instruct in home and community integration to attain remaining goals. []  See Plan of Care  []  See progress note/recertification  []  See Discharge Summary         Progress towards goals / Updated goals:   Short Term Goals: To be accomplished in 2 weeks:                   XFLQXWC will report compliance with HEP at least 1x/day to aid in rehabilitation program.                   Status at IE: NA                   Current: Met 08/19/2019     Long Term Goals: To be accomplished in 6 weeks:                   OXHWQCF will increase strength to 5/5 throughout B LEs to aid in completion of ADLs.                  Status at IE:4/5 in right LE                   Current: 5/5, MEt 9/6/2019                      Patient will report pain less than 5/10 average to aid in completion of ADLs.                  Status at IE:6/10                   Current: Pain currently 3-4/10, progressing 8/30/2019                      Patient will perform 10 pain free squats with 10lbs with good form/technique to aid in completion of ADLs.                  Status at IE:uses UE for sit<>Stand                   Current: Now able to perform squats 2 sets of 10 with 4 lbs, Progressing 9/6/2019                      Patient will improve FOTO to 51 points overall to demonstrate improvement in functional ability.                  Status at IE:45                   Current: 48.  Progressing 9/6/2019    PLAN  []  Upgrade activities as tolerated     [x]  Continue plan of care  []  Update interventions per flow sheet       []  Discharge due to:_  []  Other:_      Josh Veliz PT, DPT 9/9/2019  3:42 PM    Future Appointments   Date Time Provider Eddy Flores   9/11/2019  2:30 PM Chris Bullock PT, DPT [facility]   9/16/2019  3:30 PM Chris Bullock PT, DPT [facility]   9/18/2019  2:00 PM Chris Bullock PT, DPT [facility]   9/24/2019  2:30 PM Janie Baker PT [facility]   9/26/2019  3:30 PM Janie Baker PT [facility]

## 2019-09-11 ENCOUNTER — HOSPITAL ENCOUNTER (OUTPATIENT)
Dept: PHYSICAL THERAPY | Age: 59
End: 2019-09-11
Payer: MEDICARE

## 2019-09-16 ENCOUNTER — HOSPITAL ENCOUNTER (OUTPATIENT)
Dept: PHYSICAL THERAPY | Age: 59
Discharge: HOME | End: 2019-09-16
Payer: MEDICARE

## 2019-09-16 PROCEDURE — 97110 THERAPEUTIC EXERCISES: CPT | Performed by: PHYSICAL THERAPIST

## 2019-09-16 NOTE — PROGRESS NOTES
PT DAILY TREATMENT NOTE    Patient Name: Burgess Patel  Date:2019  : 1960  [x]  Patient  Verified  Payor: Darren Velazquez / Plan: [place] MEDICARE PART A & B / Product Type: Medicare /    In time:3:30  Out time:4:09  Total Treatment Time (min): 39  Total Timed Codes (min): 39  1:1 Treatment Time ( [address] only): 44   Visit #: 12 of 24    Treatment Area: Bilateral knee pain [M25.561, M25.562]    SUBJECTIVE  Pain Level (0-10 scale): 4  Any medication changes, allergies to medications, adverse drug reactions, diagnosis change, or new procedure performed?: [x] No    [] Yes (see summary sheet for update)  Subjective functional status/changes:   [] No changes reported  Is having a flare up of MS currently    OBJECTIVE    39 min Therapeutic Exercise:  [x] See flow sheet :   Rationale: increase ROM, increase strength and decrease pain to improve the patients ability to complete ADLs       With   [] TE   [] TA   [] neuro   [] other: Patient Education: [x] Review HEP    [] Progressed/Changed HEP based on:   [] positioning   [] body mechanics   [] transfers   [] heat/ice application    [] other:      Other Objective/Functional Measures: NA     Pain Level (0-10 scale) post treatment: 3    ASSESSMENT/Changes in Function: Patient responds well to treatment session. Patient showed some increase in difficulty with exercises today, secondary to fatigue induced by flareup. Will progress as tolelated.   No adverse effects were noted from today's treatment session     Patient will continue to benefit from skilled PT services to modify and progress therapeutic interventions, address functional mobility deficits, address ROM deficits, address strength deficits, analyze and address soft tissue restrictions, analyze and cue movement patterns, analyze and modify body mechanics/ergonomics, assess and modify postural abnormalities    []  See Plan of Care  []  See progress note/recertification  []  See Discharge Summary         Progress towards goals / Updated goals:  Short Term Goals: To be accomplished in 2 weeks:                   VDTPJKU will report compliance with HEP at least 1x/day to aid in rehabilitation program.                   Status at IE: NA                   Current: Met 08/19/2019     Long Term Goals: To be accomplished in 6 weeks:                   QEINVIC will increase strength to 5/5 throughout B LEs to aid in completion of ADLs.                  Status at IE:4/5 in right LE                   Current: 5/5, MEt 9/6/2019                      Patient will report pain less than 5/10 average to aid in completion of ADLs.                  Status at IE:6/10                   Current: Pain currently 3-4/10, progressing 8/30/2019                      Patient will perform 10 pain free squats with 10lbs with good form/technique to aid in completion of ADLs.                  Status at IE:uses UE for sit<>Stand                   Current: Now able to perform squats 2 sets of 10 with 4 lbs, Progressing 9/6/2019                      Patient will improve FOTO to 51 points overall to demonstrate improvement in functional ability.                  Status at IE:45                   Current: 48.  Progressing 9/6/2019    PLAN  []  Upgrade activities as tolerated     [x]  Continue plan of care  []  Update interventions per flow sheet       []  Discharge due to:_  []  Other:_      Dorothea Awad, PT, DPT 9/16/2019  3:51 PM    Future Appointments   Date Time Provider Eddy Flores   9/18/2019  2:00 PM Charlotte Nissen, PT, DPT [facility]   9/24/2019  2:30 PM JIGAR Hunter [facility]   9/26/2019  3:30 PM JIGAR [facility]

## 2019-09-18 ENCOUNTER — HOSPITAL ENCOUNTER (OUTPATIENT)
Dept: PHYSICAL THERAPY | Age: 59
Discharge: HOME | End: 2019-09-18
Payer: MEDICARE

## 2019-09-18 PROCEDURE — 97110 THERAPEUTIC EXERCISES: CPT | Performed by: PHYSICAL THERAPIST

## 2019-09-18 NOTE — PROGRESS NOTES
PT DAILY TREATMENT NOTE    Patient Name: Karen Barraza  Date:2019  : 1960  [x]  Patient  Verified  Payor: Kaleb Olivoyangbarney / Plan: [place] MEDICARE PART A & B / Product Type: Medicare /    In time:2:00  Out time:2:50  Total Treatment Time (min): 50  Total Timed Codes (min): 50  1:1 Treatment Time ( [address] only): 40   Visit #: 13 of 24    Treatment Area: Bilateral knee pain [M25.561, M25.562]    SUBJECTIVE  Pain Level (0-10 scale): 4  Any medication changes, allergies to medications, adverse drug reactions, diagnosis change, or new procedure performed?: [x] No    [] Yes (see summary sheet for update)  Subjective functional status/changes:   [] No changes reported  Feels better today. Was not too tired after last visit    OBJECTIVE    40 min Therapeutic Exercise:  [x] See flow sheet :   Rationale: increase ROM, increase strength and decrease pain to improve the patients ability to complete ADLs    With   [] TE   [] TA   [] neuro   [] other: Patient Education: [x] Review HEP    [] Progressed/Changed HEP based on:   [] positioning   [] body mechanics   [] transfers   [] heat/ice application    [] other:      Other Objective/Functional Measures: NA     Pain Level (0-10 scale) post treatment: 4    ASSESSMENT/Changes in Function: Patient responds well to treatment session. Patient is progressing well. No new issues.  No adverse effects were noted from today's treatment session       Patient will continue to benefit from skilled PT services to modify and progress therapeutic interventions, address functional mobility deficits, address ROM deficits, address strength deficits, analyze and address soft tissue restrictions, analyze and cue movement patterns, analyze and modify body mechanics/ergonomics, assess and modify postural abnormalities    []  See Plan of Care  []  See progress note/recertification  []  See Discharge Summary         Progress towards goals / Updated goals:  Short Term Goals: To be accomplished in 2 weeks:                   DMETKYA will report compliance with HEP at least 1x/day to aid in rehabilitation program.                   Status at IE: NA                   Current: Met 08/19/2019     Long Term Goals: To be accomplished in 6 weeks:                   RMQQKGY will increase strength to 5/5 throughout B LEs to aid in completion of ADLs.                  Status at IE:4/5 in right LE                   Current: 5/5, MEt 9/6/2019                      Patient will report pain less than 5/10 average to aid in completion of ADLs.                  Status at IE:6/10                   Current: Pain currently 3-4/10, progressing 8/30/2019                      Patient will perform 10 pain free squats with 10lbs with good form/technique to aid in completion of ADLs.                  Status at IE:uses UE for sit<>Stand                   Current: Now able to perform squats 2 sets of 10 with 4 lbs, Progressing 9/6/2019                      Patient will improve FOTO to 51 points overall to demonstrate improvement in functional ability.                  Status at IE:45                   Current: 48.  Progressing 9/6/2019    PLAN  []  Upgrade activities as tolerated     [x]  Continue plan of care  []  Update interventions per flow sheet       []  Discharge due to:_  []  Other:_      Roma Peraza PT, DPT 9/18/2019  2:19 PM    Future Appointments   Date Time Provider Eddy Flores   9/24/2019  2:30 PM JIGAR Barton [facility]   9/26/2019  3:30 PM JIGAR Barton [facility]

## 2019-09-24 ENCOUNTER — HOSPITAL ENCOUNTER (OUTPATIENT)
Dept: PHYSICAL THERAPY | Age: 59
Discharge: HOME | End: 2019-09-24
Payer: MEDICARE

## 2019-09-24 PROCEDURE — 97110 THERAPEUTIC EXERCISES: CPT

## 2019-09-24 NOTE — PROGRESS NOTES
PT DAILY TREATMENT NOTE - [facility]     Patient Name: Enedina Freire  Date:2019  : 1960  [x]  Patient  Verified  Payor: Mayank Pedraza / Plan: [place] MEDICARE PART A & B / Product Type: Medicare /    In time:230  Out time:300  Total Treatment Time (min): 30  Total Timed Codes (min): 30   1:1 Treatment Time ( [address] only): 30   Visit #: 9 of 12    Treatment Area: Bilateral knee pain [M25.561, M25.562]    SUBJECTIVE  Pain Level (0-10 scale): 4  Any medication changes, allergies to medications, adverse drug reactions, diagnosis change, or new procedure performed?: [x] No    [] Yes (see summary sheet for update)  Subjective functional status/changes:   [] No changes reported  Patient reports that she is feeling tired today. She states that she would like to participate in exercise but wants to reduce intensity. OBJECTIVE      30 min Therapeutic Exercise:  [x] See flow sheet :   Rationale: increase ROM, increase strength and decrease pain to improve the patients ability to complete ADLs          With   [x] TE   [] TA   [] neuro   [] other: Patient Education: [x] Review HEP    [] Progressed/Changed HEP based on:   [] positioning   [] body mechanics   [] transfers   [] heat/ice application    [] other:      Other Objective/Functional Measures: NA     Pain Level (0-10 scale) post treatment: 4    ASSESSMENT/Changes in Function: Patient responds well to treatment session. Patient reported feeling tired today secondary to MS. Reduced intensity of exercise to reduce inducing fatigue.  No adverse effects were noted from today's treatment session      Patient will continue to benefit from skilled PT services to modify and progress therapeutic interventions, address functional mobility deficits, address ROM deficits, address strength deficits, analyze and address soft tissue restrictions, analyze and cue movement patterns, analyze and modify body mechanics/ergonomics, assess and modify postural abnormalities, address imbalance and instruct in home and community integration to attain remaining goals. []  See Plan of Care  []  See progress note/recertification  []  See Discharge Summary         Progress towards goals / Updated goals:  Short Term Goals: To be accomplished in 2 weeks:                   EFICBSL will report compliance with HEP at least 1x/day to aid in rehabilitation program.                   Status at IE: NA                   Current: Met 08/19/2019     Long Term Goals: To be accomplished in 6 weeks:                   DYAVUMM will increase strength to 5/5 throughout B LEs to aid in completion of ADLs.                  Status at IE:4/5 in right LE                   Current: 5/5, Met 9/6/2019                      Patient will report pain less than 5/10 average to aid in completion of ADLs.                  Status at IE:6/10                   Current: Pain currently 4/10, progressing 9/24/2019                      Patient will perform 10 pain free squats with 10lbs with good form/technique to aid in completion of ADLs.                  Status at IE:uses UE for sit<>Stand                   Current: Now able to perform squats 2 sets of 10 with 5lbs, Progressing 9/24/2019                      Patient will improve FOTO to 51 points overall to demonstrate improvement in functional ability.                  Status at IE:45                   Current: 48.  Progressing 9/6/2019    PLAN  []  Upgrade activities as tolerated     [x]  Continue plan of care  []  Update interventions per flow sheet       []  Discharge due to:_  []  Other:_      Gurinder Mora, PT, DPT 9/24/2019  2:47 PM    Future Appointments   Date Time Provider Eddy Flores   9/26/2019  3:30 PM JIGAR Swanson [facility]   10/1/2019  3:00 PM Keyanna Clemente [facility]   10/3/2019  2:00 PM JIGAR Swanson [facility]   10/8/2019  2:00 PM JIGAR Swanson [facility]

## 2019-09-26 ENCOUNTER — HOSPITAL ENCOUNTER (OUTPATIENT)
Dept: PHYSICAL THERAPY | Age: 59
Discharge: HOME | End: 2019-09-26
Payer: MEDICARE

## 2019-09-26 PROCEDURE — 97110 THERAPEUTIC EXERCISES: CPT

## 2019-09-26 NOTE — PROGRESS NOTES
PT DAILY TREATMENT NOTE - [facility]     Patient Name: Dick Moeller  Date:2019  : 1960  [x]  Patient  Verified  Payor: Real Bauman / Plan: [place] MEDICARE PART A & B / Product Type: Medicare /    In time:330  Out time:400  Total Treatment Time (min): 30  Total Timed Codes (min): 30   1:1 Treatment Time ( [address] only): 30   Visit #: 10 of 12    Treatment Area: Bilateral knee pain [M25.561, M25.562]    SUBJECTIVE  Pain Level (0-10 scale): 4  Any medication changes, allergies to medications, adverse drug reactions, diagnosis change, or new procedure performed?: [x] No    [] Yes (see summary sheet for update)  Subjective functional status/changes:   [] No changes reported    Patient reports a feeling of malaise secondary to MS. She states that she would like to participate in physical therapy despite not feeling well as she feels better than last visit. OBJECTIVE     30 min Therapeutic Exercise:  [x] See flow sheet :   Rationale: increase ROM, increase strength and decrease pain to improve the patients ability to complete ADLs          With   [x] TE   [] TA   [] neuro   [] other: Patient Education: [x] Review HEP    [] Progressed/Changed HEP based on:   [] positioning   [] body mechanics   [] transfers   [] heat/ice application    [] other:      Other Objective/Functional Measures: NA     Pain Level (0-10 scale) post treatment: 4    ASSESSMENT/Changes in Function: Patient responds well to treatment session. Reduced intensity of due to patient report of feeling malaise.  No adverse effects were noted from today's treatment session      Patient will continue to benefit from skilled PT services to modify and progress therapeutic interventions, address functional mobility deficits, address ROM deficits, address strength deficits, analyze and address soft tissue restrictions, analyze and cue movement patterns, analyze and modify body mechanics/ergonomics, assess and modify postural abnormalities,  instruct in home and community integration to attain remaining goals. []  See Plan of Care  []  See progress note/recertification  []  See Discharge Summary         Progress towards goals / Updated goals:   Short Term Goals: To be accomplished in 2 weeks:                   HKVYHIR will report compliance with HEP at least 1x/day to aid in rehabilitation program.                   Status at IE: NA                   Current: Met 08/19/2019     Long Term Goals: To be accomplished in 6 weeks:                   ZHRXIGC will increase strength to 5/5 throughout B LEs to aid in completion of ADLs.                  Status at IE:4/5 in right LE                   Current: 5/5, Met 9/6/2019                      Patient will report pain less than 5/10 average to aid in completion of ADLs.                  Status at IE:6/10                   Current: Pain currently 4/10, progressing 9/24/2019                      Patient will perform 10 pain free squats with 10lbs with good form/technique to aid in completion of ADLs.                  Status at IE:uses UE for sit<>Stand                   Current: Now able to perform squats 2 sets of 10 with 5lbs, Progressing 9/24/2019                      Patient will improve FOTO to 51 points overall to demonstrate improvement in functional ability.                    Status at IE:45                   Current: 33 Progressing 9/6/2019    PLAN  []  Upgrade activities as tolerated     [x]  Continue plan of care  []  Update interventions per flow sheet       []  Discharge due to:_  []  Other:_      Deepak Pierce PT, DPT 9/26/2019  3:34 PM    Future Appointments   Date Time Provider Eddy Flores   10/1/2019  3:00 PM Keyanna Velasco [facility]   10/3/2019  2:00 PM JIGAR Garcia [facility]   10/8/2019  2:00 PM JIGAR Garcia [facility]

## 2019-10-01 ENCOUNTER — HOSPITAL ENCOUNTER (OUTPATIENT)
Dept: PHYSICAL THERAPY | Age: 59
Discharge: HOME | End: 2019-10-01
Payer: MEDICARE

## 2019-10-01 PROCEDURE — 97110 THERAPEUTIC EXERCISES: CPT

## 2019-10-01 NOTE — PROGRESS NOTES
PT DAILY TREATMENT NOTE    Patient Name: Matt Saeed  Date:10/1/2019  : 1960  [x]  Patient  Verified  Payor: [place] MEDICARE / Plan: [place] MEDICARE PART A & B / Product Type: Medicare /    In time:300  Out time: 3:46  Total Treatment Time (min): 46  Total Timed Codes (min): 41  1:1 Treatment Time ( [address] only):  41  Visit #: 16    Treatment Area: Right knee pain [M25.561]  Left knee pain [M25.562]    SUBJECTIVE  Pain Level (0-10 scale): 4  Any medication changes, allergies to medications, adverse drug reactions, diagnosis change, or new procedure performed?: [x] No    [] Yes (see summary sheet for update)  Subjective functional status/changes:   [] No changes reported  \"I am feeling a little better than last week. I think we can try going back to doing some of the exercises we had to skip last week when the knees were bothering me more. \"    OBJECTIVE    41 min Therapeutic Exercise:  [x] See flow sheet :   Rationale: increase ROM, increase strength and decrease pain to improve the patients ability to complete ADLs     With   [] TE   [] TA   [] neuro   [] other: Patient Education: [x] Review HEP    [] Progressed/Changed HEP based on:   [] positioning   [] body mechanics   [] transfers   [] heat/ice application    [] other:      Other Objective/Functional Measures: NA     Pain Level (0-10 scale) post treatment: 4    ASSESSMENT/Changes in Function: Patient responds well to treatment session. Patient able to return to participation in full program, including step exercises and also able to increase resistance of some exercises with good tolerance. No adverse effects were noted from today's treatment session.      Patient will continue to benefit from skilled PT services to modify and progress therapeutic interventions, address functional mobility deficits, address ROM deficits, address strength deficits, analyze and address soft tissue restrictions, analyze and cue movement patterns, analyze and modify body mechanics/ergonomics, assess and modify postural abnormalities,  and instruct in home and community integration to attain remaining goals. []  See Plan of Care  []  See progress note/recertification  []  See Discharge Summary         Progress towards goals / Updated goals:  Short Term Goals: To be accomplished in 2 weeks:                   SORVHPJ will report compliance with HEP at least 1x/day to aid in rehabilitation program.                   Status at IE: NA                   Current: Met 08/19/2019     Long Term Goals: To be accomplished in 6 weeks:                   FJKRVCR will increase strength to 5/5 throughout B LEs to aid in completion of ADLs.                  Status at IE:4/5 in right LE                   Current: 5/5, Met 9/6/2019                      Patient will report pain less than 5/10 average to aid in completion of ADLs.                  Status at IE:6/10                   Current: Pain currently 4/10, progressing 9/24/2019                      Patient will perform 10 pain free squats with 10lbs with good form/technique to aid in completion of ADLs.                  Status at IE:uses UE for sit<>Stand                   Current: Now able to perform squats 2 sets of 10 with 6 lbs, Progressing 10/1/2019                      Patient will improve FOTO to 51 points overall to demonstrate improvement in functional ability.                    Status at IE:45                   Current: 33 Progressing 9/6/2019    PLAN  []  Upgrade activities as tolerated     [x]  Continue plan of care  []  Update interventions per flow sheet       []  Discharge due to:_  []  Other:_      Osbaldo Orourke PT, DPT 10/1/2019  2:58 PM    Future Appointments   Date Time Provider Eddy Flores   10/1/2019  3:00 PM Keyanna Whitmore [facility]   10/3/2019  2:00 PM JIGAR Grant [facility]   10/8/2019  2:00 PM JIGAR Grant [facility]

## 2019-10-03 ENCOUNTER — HOSPITAL ENCOUNTER (OUTPATIENT)
Dept: PHYSICAL THERAPY | Age: 59
Discharge: HOME | End: 2019-10-03
Payer: MEDICARE

## 2019-10-03 PROCEDURE — 97110 THERAPEUTIC EXERCISES: CPT

## 2019-10-03 NOTE — PROGRESS NOTES
[facility] at [address] [place] [address] Drive: [phone]   Fax: [phone]  Progress Note  Patient Name: Mannie Ornelas : 1960   Medical   Diagnosis: Right knee pain [M25.561]  Left knee pain [M25.562] Treatment Diagnosis: Left Knee Pain  Right Knee pain   Onset Date: Chronic     Referral Source: Lori Bishop MD Start of Care [facility]): 2019   Prior Hospitalization: See medical history Provider #: [id number]   Prior Level of Function: Cleaning around the house   Comorbidities: OA, MS, DMII   Medications: Verified on Patient Summary List      ===========================================================================================  Assessment / Summary of Care:     Mannie Ornelas is a 61 y.o. female with chronic bilateral knee pain. Patient is improving as she has met 1/1 STGs and 2/4 LTGs and is progressing toward her remaining goals. She has gained strength promoting increased activity tolerance. Patient will begin transition toward independence with advanced HEP in future visits.  Patient will continue to benefit from skilled PT services to modify and progress therapeutic interventions, address functional mobility deficits, address ROM deficits, address strength deficits, analyze and address soft tissue restrictions, analyze and cue movement patterns, analyze and modify body mechanics/ergonomics, assess and modify postural abnormalities, address imbalance/dizziness and instruct in home and community integration to attain remaining goals.    ===========================================================================================    Plan:Continue therapy per initial plan/protocol at a frequency of  2 x per week for 3 weeks    Goals:   Short Term Goals: To be accomplished in 2 weeks:                   Patient will report compliance with HEP at least 1x/day to aid in rehabilitation program.                   Status at IE: NA                   Current: Met 08/19/2019     Long Term Goals: To be accomplished in 6 weeks:                   VVJJCYZ will increase strength to 5/5 throughout B LEs to aid in completion of ADLs.                  Status at IE:4/5 in right LE                   Current: 5/5, Met 9/6/2019                      Patient will report pain less than 5/10 average to aid in completion of ADLs.                  Status at IE:6/10                   Current: Pain currently 5/10, progressing 10/03/2019                      Patient will perform 10 pain free squats with 10lbs with good form/technique to aid in completion of ADLs.                  Status at IE:uses UE for sit<>Stand                   Current: Now able to perform squats 2 sets of 10 with 6 lbs, Progressing 10/1/2019                      Patient will improve FOTO to 51 points overall to demonstrate improvement in functional ability.                  Status at IE:45                   Current:   Met 54 10/03/2019    ===========================================================================================  Subjective: Patient reports that she is feeling better today. She reports that her overall activity tolerance is improving in regard to her knees. Objective:   FOTO 54  MMT 5/5  5/10 pain with ADLs in bilateral knees    Therapist Signature: Virgilio Rodriguez PT, DPT Date: 51/2/6636   Re-Certification:  Time: 9:60 PM       I certify that the above Therapy Services are being furnished while the patient is under my care. I agree with the treatment plan and certify that this therapy is necessary. [] I have read the above and request that my patient continue as recommended.   [] I have read the above report and request that my patient continue therapy with the following changes/special instructions: ______________________________________  [] I have read the above report and request that my patient be discharged from therapy    Physician's Signature:____________Date:_________TIME:________    ** Signature, Date and Time must be completed for valid certification **    [facility] at [facility], [address] [place] [address]   Phone: [phone]   Fax: [phone]

## 2019-10-03 NOTE — PROGRESS NOTES
PT DAILY TREATMENT NOTE - [facility]     Patient Name: Matias Pelaez  Date:10/3/2019  : 1960  [x]  Patient  Verified  Payor: [place] MEDICARE / Plan: [place] MEDICARE PART A & B / Product Type: Medicare /    In time:205  Out time:240  Total Treatment Time (min): 35  Total Timed Codes (min): 35   1:1 Treatment Time ( [address] only): 30   Visit #: 17 of 20    Treatment Area: Right knee pain [M25.561]  Left knee pain [M25.562]    SUBJECTIVE  Pain Level (0-10 scale): 3  Any medication changes, allergies to medications, adverse drug reactions, diagnosis change, or new procedure performed?: [x] No    [] Yes (see summary sheet for update)  Subjective functional status/changes:   [] No changes reported    Patient reports that she is feeling better today. She reports that her overall activity tolerance is improving in regard to her knees. OBJECTIVE      30 min Therapeutic Exercise:  [x] See flow sheet :   Rationale: increase ROM, increase strength and decrease pain to improve the patients ability to complete ADLs          With   [x] TE   [] TA   [] neuro   [] other: Patient Education: [x] Review HEP    [] Progressed/Changed HEP based on:   [] positioning   [] body mechanics   [] transfers   [] heat/ice application    [] other:      Other Objective/Functional Measures:   FOTO 54  MMT 5/5  5/10 pain with ADLs in bilateral knees     Pain Level (0-10 scale) post treatment: 3    ASSESSMENT/Changes in Function:     Patient responds well to treatment session. No adverse effects were noted from today's treatment session. Patient is improving as she has met 1/1 STGs and 2/4 LTGs and is progressing toward her remaining goals. She has gained strength promoting increased activity tolerance. Patient will begin transition toward independence with advanced HEP in future visits.  Patient will continue to benefit from skilled PT services to modify and progress therapeutic interventions, address functional mobility deficits, address ROM deficits, address strength deficits, analyze and address soft tissue restrictions, analyze and cue movement patterns, analyze and modify body mechanics/ergonomics, assess and modify postural abnormalities, address imbalance/dizziness and instruct in home and community integration to attain remaining goals. []  See Plan of Care  []  See progress note/recertification  []  See Discharge Summary         Progress towards goals / Updated goals:  Short Term Goals: To be accomplished in 2 weeks:                   FFKQANQ will report compliance with HEP at least 1x/day to aid in rehabilitation program.                   Status at IE: NA                   Current: Met 08/19/2019     Long Term Goals: To be accomplished in 6 weeks:                   QPYIWNT will increase strength to 5/5 throughout B LEs to aid in completion of ADLs.                  Status at IE:4/5 in right LE                   Current: 5/5, Met 9/6/2019                      Patient will report pain less than 5/10 average to aid in completion of ADLs.                  Status at IE:6/10                   Current: Pain currently 5/10, progressing 10/03/2019                      Patient will perform 10 pain free squats with 10lbs with good form/technique to aid in completion of ADLs.                  Status at IE:uses UE for sit<>Stand                   Current: Now able to perform squats 2 sets of 10 with 6 lbs, Progressing 10/1/2019                      Patient will improve FOTO to 51 points overall to demonstrate improvement in functional ability.                    Status at IE:45                   Current:   Met 54 10/03/2019       PLAN  []  Upgrade activities as tolerated     []  Continue plan of care  []  Update interventions per flow sheet       []  Discharge due to:_  []  Other:_      Susan Carvalho PT, DPT 10/3/2019  2:09 PM    Future Appointments   Date Time Provider Eddy Flores   10/8/2019  2:00 PM JIGAR [facility]CH[facility]

## 2019-10-08 ENCOUNTER — HOSPITAL ENCOUNTER (OUTPATIENT)
Dept: PHYSICAL THERAPY | Age: 59
Discharge: HOME | End: 2019-10-08
Payer: MEDICARE

## 2019-10-08 PROCEDURE — 97110 THERAPEUTIC EXERCISES: CPT

## 2019-10-08 NOTE — PROGRESS NOTES
PT DAILY TREATMENT NOTE - [facility]     Patient Name: Darvin Vega  Date:10/8/2019  : 1960  [x]  Patient  Verified  Payor: [place] MEDICARE / Plan: VA MEDICARE PART A & B / Product Type: Medicare /    In time:200  Out time:230  Total Treatment Time (min): 30  Total Timed Codes (min): 30   1:1 Treatment Time ( [address] only): 30   Visit #: 18 of 20    Treatment Area: Right knee pain [M25.561]  Left knee pain [M25.562]    SUBJECTIVE  Pain Level (0-10 scale): 3  Any medication changes, allergies to medications, adverse drug reactions, diagnosis change, or new procedure performed?: [x] No    [] Yes (see summary sheet for update)  Subjective functional status/changes:   [] No changes reported    Patient reports that she is feeling much better today. OBJECTIVE      30 min Therapeutic Exercise:  [x] See flow sheet :   Rationale: increase ROM, increase strength and decrease pain to improve the patients ability to complete ADLs          With   [x] TE   [] TA   [] neuro   [] other: Patient Education: [x] Review HEP    [] Progressed/Changed HEP based on:   [] positioning   [] body mechanics   [] transfers   [] heat/ice application    [] other:      Other Objective/Functional Measures: NA     Pain Level (0-10 scale) post treatment: 3    ASSESSMENT/Changes in Function: Patient responds well to treatment session. Progressed patient by increasing exercise intensity via gains in resistance. Patient required cues to to allow for proper muscle recovery in between exercise sets. Advance exercise as appropriate.  No adverse effects were noted from today's treatment session    Patient will continue to benefit from skilled PT services to modify and progress therapeutic interventions, address functional mobility deficits, address ROM deficits, address strength deficits, analyze and address soft tissue restrictions, analyze and cue movement patterns, analyze and modify body mechanics/ergonomics, assess and modify postural abnormalities, instruct in home and community integration to attain remaining goals. []  See Plan of Care  []  See progress note/recertification  []  See Discharge Summary         Progress towards goals / Updated goals:  Short Term Goals: To be accomplished in 2 weeks:                   VCEVRAF will report compliance with HEP at least 1x/day to aid in rehabilitation program.                   Status at IE: NA                   Current: Met 08/19/2019     Long Term Goals: To be accomplished in 6 weeks:                   JACOPAV will increase strength to 5/5 throughout B LEs to aid in completion of ADLs.                  Status at IE:4/5 in right LE                   Current: 5/5, Met 9/6/2019                      Patient will report pain less than 5/10 average to aid in completion of ADLs.                  Status at IE:6/10                   Current: Ramírez Medrano 10/08/2019                      Patient will perform 10 pain free squats with 10lbs with good form/technique to aid in completion of ADLs.                  Status at IE:uses UE for sit<>Stand                   Current: Now able to perform squats 2 sets of 10 with 6 lbs, Progressing 10/1/2019                      Patient will improve FOTO to 51 points overall to demonstrate improvement in functional ability.                    Status at IE:45                   Current:   Met 54 10/03/2019    PLAN  []  Upgrade activities as tolerated     [x]  Continue plan of care  []  Update interventions per flow sheet       []  Discharge due to:_  []  Other:_      Varun Norwood, PT, DPT 10/8/2019  2:03 PM    Future Appointments   Date Time Provider Eddy Flores   10/10/2019  3:00 PM Leesa Vickers PT, DPT [facility]   10/15/2019  3:00 PM Wilfredo Lion PT [facility]   10/17/2019  2:30 PM Leesa Vickers, PT, DPT [facility]   10/22/2019  2:00 PM Dodie Torrez PT [facility]   10/24/2019  3:00 PM Dodie Torrez, PT [facility]

## 2019-10-10 ENCOUNTER — HOSPITAL ENCOUNTER (OUTPATIENT)
Dept: PHYSICAL THERAPY | Age: 59
Discharge: HOME | End: 2019-10-10
Payer: MEDICARE

## 2019-10-10 PROCEDURE — 97110 THERAPEUTIC EXERCISES: CPT | Performed by: PHYSICAL THERAPIST

## 2019-10-10 NOTE — PROGRESS NOTES
PT DAILY TREATMENT NOTE    Patient Name: Radha Trejo  Date:10/10/2019  : 1960  [x]  Patient  Verified  Payor: Richardson Jason / Plan: VA MEDICARE PART A & B / Product Type: Medicare /    In time:3:00  Out time:3:34  Total Treatment Time (min): 34  Total Timed Codes (min): 34  1:1 Treatment Time ([facility] only): 34   Visit #: 19 of 20    Treatment Area: Right knee pain [M25.561]  Left knee pain [M25.562]    SUBJECTIVE  Pain Level (0-10 scale): 2  Any medication changes, allergies to medications, adverse drug reactions, diagnosis change, or new procedure performed?: [x] No    [] Yes (see summary sheet for update)  Subjective functional status/changes:   [] No changes reported  Feels good. No new issues    OBJECTIVE    34 min Therapeutic Exercise:  [x] See flow sheet :   Rationale: increase ROM, increase strength and decrease pain to improve the patients ability to complete ADLs       With   [] TE   [] TA   [] neuro   [] other: Patient Education: [x] Review HEP    [] Progressed/Changed HEP based on:   [] positioning   [] body mechanics   [] transfers   [] heat/ice application    [] other:      Other Objective/Functional Measures: NA     Pain Level (0-10 scale) post treatment: 0    ASSESSMENT/Changes in Function: Patient responds well to treatment session. Patient is progressing well. Minimal to no difficulty with LAQs. Will attempt weighted machine in gym next session. Progress as tolerated.  No adverse effects were noted from today's treatment session       Patient will continue to benefit from skilled PT services to modify and progress therapeutic interventions, address functional mobility deficits, address ROM deficits, address strength deficits, analyze and address soft tissue restrictions, analyze and cue movement patterns, analyze and modify body mechanics/ergonomics, assess and modify postural abnormalities    []  See Plan of Care  []  See progress note/recertification  []  See Discharge Summary Progress towards goals / Updated goals:  Short Term Goals: To be accomplished in 2 weeks:                   GWTPHJB will report compliance with HEP at least 1x/day to aid in rehabilitation program.                   Status at IE: NA                   Current: Met 08/19/2019     Long Term Goals: To be accomplished in 6 weeks:                   CSWIFBO will increase strength to 5/5 throughout B LEs to aid in completion of ADLs.                  Status at IE:4/5 in right LE                   Current: 5/5, Met 9/6/2019                      Patient will report pain less than 5/10 average to aid in completion of ADLs.                  Status at IE:6/10                   Current: Rosmery Gandara 10/08/2019                      Patient will perform 10 pain free squats with 10lbs with good form/technique to aid in completion of ADLs.                  Status at IE:uses UE for sit<>Stand                   Current: Now able to perform squats 2 sets of 10 with 6 lbs, Progressing 10/1/2019                      Patient will improve FOTO to 51 points overall to demonstrate improvement in functional ability.                    Status at IE:45                   Current:   Met 54 10/03/2019    PLAN  []  Upgrade activities as tolerated     [x]  Continue plan of care  []  Update interventions per flow sheet       []  Discharge due to:_  []  Other:_      Tiffany Baugh, PT, DPT 10/10/2019  3:26 PM    Future Appointments   Date Time Provider Eddy Flores   10/15/2019  3:00 PM Phylicia Henderson, PT [facility]   10/17/2019  2:30 PM Brent Frausto PT, DPT [facility]   10/22/2019  2:00 PM Cammy Richards PT [facility]   10/24/2019  3:00 PM Cammy Richards PT [facility]

## 2019-10-15 ENCOUNTER — HOSPITAL ENCOUNTER (OUTPATIENT)
Dept: PHYSICAL THERAPY | Age: 59
Discharge: HOME | End: 2019-10-15
Payer: MEDICARE

## 2019-10-15 PROCEDURE — 97110 THERAPEUTIC EXERCISES: CPT

## 2019-10-15 NOTE — PROGRESS NOTES
PT DAILY TREATMENT NOTE    Patient Name: Zaynab Monroe  Date:10/15/2019  : 1960  [x]  Patient  Verified  Payor: Gray Cordero / Plan: [place] MEDICARE PART A & B / Product Type: Medicare /    In time: 300  Out time: 400  Total Treatment Time (min): 53  Total Timed Codes (min): 48  1:1 Treatment Time ( [address] only): 48   Visit #: 20 of     Treatment Area: Right knee pain [M25.561]  Left knee pain [M25.562]    SUBJECTIVE  Pain Level (0-10 scale): 2  Any medication changes, allergies to medications, adverse drug reactions, diagnosis change, or new procedure performed?: [x] No    [] Yes (see summary sheet for update)  Subjective functional status/changes:   [] No changes reported  \"Going pretty well lately. I haven't had any giving out of the knee this week. \"    OBJECTIVE    48 min Therapeutic Exercise:  [x] See flow sheet :   Rationale: increase ROM, increase strength and decrease pain to improve the patients ability to complete ADLs     With   [] TE   [] TA   [] neuro   [] other: Patient Education: [x] Review HEP    [] Progressed/Changed HEP based on:   [] positioning   [] body mechanics   [] transfers   [] heat/ice application    [] other:      Other Objective/Functional Measures: NA     Pain Level (0-10 scale) post treatment: 0    ASSESSMENT/Changes in Function: Patient responds well to treatment session. Advancing exercises well without recurrence of pain. No adverse effects were noted from today's treatment session. Patient will continue to benefit from skilled PT services to modify and progress therapeutic interventions, address functional mobility deficits, address ROM deficits, address strength deficits, analyze and address soft tissue restrictions, analyze and cue movement patterns, analyze and modify body mechanics/ergonomics, assess and modify postural abnormalities,  and instruct in home and community integration to attain remaining goals.     []  See Plan of Care  []  See progress note/recertification  []  See Discharge Summary         Progress towards goals / Updated goals:  Short Term Goals: To be accomplished in 2 weeks:                   KJMGCPH will report compliance with HEP at least 1x/day to aid in rehabilitation program.                   Status at IE: NA                   Current: Met 08/19/2019     Long Term Goals: To be accomplished in 6 weeks:                   LQBFUMS will increase strength to 5/5 throughout B LEs to aid in completion of ADLs.                  Status at IE:4/5 in right LE                   Current: 5/5, Met 9/6/2019                      Patient will report pain less than 5/10 average to aid in completion of ADLs.                  Status at IE:6/10                   Current: Met currently 3-4/10 10/08/2019                      Patient will perform 10 pain free squats with 10lbs with good form/technique to aid in completion of ADLs.                  Status at IE:uses UE for sit<>Stand                   Current: Now able to perform squats 2 sets of 10 with 6 lbs, Progressing 10/1/2019                      Patient will improve FOTO to 51 points overall to demonstrate improvement in functional ability.                    Status at IE:45                   Current:   Met 53  10/15/2019    PLAN  []  Upgrade activities as tolerated     [x]  Continue plan of care  []  Update interventions per flow sheet       []  Discharge due to:_  []  Other:_      Gisel Oneil, PT, DPT 10/15/2019  3:05 PM    Future Appointments   Date Time Provider Eddy Flores   10/17/2019  2:30 PM April May PT, DPT [facility]   10/22/2019  2:00 PM JIGAR Scott [facility]   10/24/2019  3:00 PM JIGAR Scott [facility]

## 2019-10-17 ENCOUNTER — HOSPITAL ENCOUNTER (OUTPATIENT)
Dept: PHYSICAL THERAPY | Age: 59
Discharge: HOME | End: 2019-10-17
Payer: MEDICARE

## 2019-10-17 PROCEDURE — 97110 THERAPEUTIC EXERCISES: CPT | Performed by: PHYSICAL THERAPIST

## 2019-10-22 ENCOUNTER — HOSPITAL ENCOUNTER (OUTPATIENT)
Dept: PHYSICAL THERAPY | Age: 59
Discharge: HOME | End: 2019-10-22
Payer: MEDICARE

## 2019-10-22 PROCEDURE — 97110 THERAPEUTIC EXERCISES: CPT

## 2019-10-22 NOTE — PROGRESS NOTES
PT DAILY TREATMENT NOTE - [facility]     Patient Name: Olayinka Escobar  Date:10/22/2019  : 1960  [x]  Patient  Verified  Payor: Rubin Bloch / Plan: [place] / Product Type: Medicare /    In time:200  Out time:240  Total Treatment Time (min): 40  Total Timed Codes (min): 40   1:1 Treatment Time ( [address] only): 30   Visit #: 22 of 23    Treatment Area: Right knee pain [M25.561]  Left knee pain [M25.562]    SUBJECTIVE  Pain Level (0-10 scale): 2  Any medication changes, allergies to medications, adverse drug reactions, diagnosis change, or new procedure performed?: [x] No    [] Yes (see summary sheet for update)  Subjective functional status/changes:   [] No changes reported    Patient reports that her back was sore after last visit. She states that leg press and knee extension machine on the same day may have been too much activity. OBJECTIVE    30 min Therapeutic Exercise:  [x] See flow sheet :   Rationale: increase ROM, increase strength and decrease pain to improve the patients ability to complete ADLs          With   [x] TE   [] TA   [] neuro   [] other: Patient Education: [x] Review HEP    [] Progressed/Changed HEP based on:   [] positioning   [] body mechanics   [] transfers   [] heat/ice application    [] other:      Other Objective/Functional Measures: NA     Pain Level (0-10 scale) post treatment: 2    ASSESSMENT/Changes in Function: Patient responds well to treatment session. Patient demonstrated improved autonomy with exercise. Will reassess next visit for potential discharge.  No adverse effects were noted from today's treatment session      Patient will continue to benefit from skilled PT services to modify and progress therapeutic interventions, address functional mobility deficits, address ROM deficits, address strength deficits, analyze and address soft tissue restrictions, analyze and cue movement patterns, analyze and modify body mechanics/ergonomics, assess and modify postural abnormalities, instruct in home and community integration to attain remaining goals. []  See Plan of Care  []  See progress note/recertification  []  See Discharge Summary         Progress towards goals / Updated goals:  Short Term Goals: To be accomplished in 2 weeks:                   ARLEN will report compliance with HEP at least 1x/day to aid in rehabilitation program.                   Status at IE: NA                   Current: Met 08/19/2019     Long Term Goals: To be accomplished in 6 weeks:                   FGESZBK will increase strength to 5/5 throughout B LEs to aid in completion of ADLs.                  Status at IE:4/5 in right LE                   Current: 5/5, Met 9/6/2019                      Patient will report pain less than 5/10 average to aid in completion of ADLs.                  Status at IE:6/10                   Current: Met currently 3-4/10 10/08/2019                      Patient will perform 10 pain free squats with 10lbs with good form/technique to aid in completion of ADLs.                  Status at IE:uses UE for sit<>Stand                   Current: Now able to perform squats 2 sets of 10 with 6 lbs, Progressing 10/1/2019                      Patient will improve FOTO to 51 points overall to demonstrate improvement in functional ability.                    Status at IE:45                   Current:   Met 53  10/15/2019    PLAN  []  Upgrade activities as tolerated     [x]  Continue plan of care  []  Update interventions per flow sheet       []  Discharge due to:_  []  Other:_      Martínez Blankenship PT, DPT 10/22/2019  2:00 PM    Future Appointments   Date Time Provider Eddy Flores   10/24/2019  3:00 PM JIG[facility]

## 2019-10-24 ENCOUNTER — HOSPITAL ENCOUNTER (OUTPATIENT)
Dept: PHYSICAL THERAPY | Age: 59
Discharge: HOME | End: 2019-10-24
Payer: MEDICARE

## 2019-10-24 PROCEDURE — 97110 THERAPEUTIC EXERCISES: CPT

## 2019-10-24 NOTE — PROGRESS NOTES
[facility] at [address] [place] [address] Drive: [phone]   Fax: [phone]  Discharge Summary  Patient Name: Stefan Cervantes : 1960   Medical   Diagnosis: Right knee pain [M25.561]  Left knee pain [M25.562] Treatment Diagnosis: Left Knee Pain  Right Knee pain   Onset Date: Chronic     Referral Source: Tian Gil MD Start of Care [facility] [place]): 10/24/2019   Prior Hospitalization: See medical history Provider #: [id number]   Prior Level of Function: Cleaning around the house   Comorbidities: OA, MS, DMII   Medications: Verified on Patient Summary List      ===========================================================================================  Assessment / Summary of Care:  Stefan Cervantes is a 61 y.o. female with chronic bilateral knee pain. Patient is being discharged as she has met 100% of her short and long term goals. She has developed strength promoting increased activity tolerance. She has become independent with exercise and plans to participate in an independent fitness program to reduce future episodes of care. Provided HEP to promote seamless transition to independent wellness.     ===========================================================================================    Plan: Discharge to independent HEP. Goals:   Short Term Goals: To be accomplished in 2 weeks:                   CBQIAAN will report compliance with HEP at least 1x/day to aid in rehabilitation program.                   Status at IE: NA                   Current: Met 2019     Long Term Goals: To be accomplished in 6 weeks:                   BGMAHEX will increase strength to 5/5 throughout B LEs to aid in completion of ADLs.                  Status at IE:4/5 in right LE                   Current: , Met 2019                      Patient will report pain less than 5/10 average to aid in completion of ADLs.                    Status at IE:6/10                   Current: Met currently 3-4/10 10/08/2019                      Patient will perform 10 pain free squats with 10lbs with good form/technique to aid in completion of ADLs.                  Status at [address] for sit<>Stand                   Current: Met 10/24/2019                      Patient will improve FOTO to 51 points overall to demonstrate improvement in functional ability.                  Status at IE:45                   Current:   Met 53  10/15/2019    ===========================================================================================  Subjective: Patient reports that she is independent with exercise. She reports that she feels like she has made significant improvement.       Objective:   MMT 5/5   FOTO 53  10 sit to stands with 10 #     Therapist Signature: Adan Browne PT, DPT Date: 10/24/2019     Time: 4:01 PM

## 2019-10-24 NOTE — PROGRESS NOTES
PT DAILY TREATMENT NOTE - [facility]     Patient Name: Josefina Hunter  Date:10/24/2019  : 1960  [x]  Patient  Verified  Payor: Rica Kahn / Plan: [place] MED[place]ARE PART A & B / Product Type: Medicare /    In time:300  Out time:340  Total Treatment Time (min): 40  Total Timed Codes (min): 40   1:1 Treatment Time ( [address] only): 40   Visit #: 23 of 24    Treatment Area: Right knee pain [M25.561]  Left knee pain [M25.562]    SUBJECTIVE  Pain Level (0-10 scale): 2  Any medication changes, allergies to medications, adverse drug reactions, diagnosis change, or new procedure performed?: [x] No    [] Yes (see summary sheet for update)  Subjective functional status/changes:   [] No changes reported  Patient reports that she is independent with exercise. She reports that she feels like she has made significant improvement. OBJECTIVE    40 min Therapeutic Exercise:  [x] See flow sheet :   Rationale: increase ROM, increase strength and decrease pain to improve the patients ability to complete ADLs          With   [x] TE   [] TA   [] neuro   [] other: Patient Education: [x] Review HEP    [] Progressed/Changed HEP based on:   [] positioning   [] body mechanics   [] transfers   [] heat/ice application    [] other:      Other Objective/Functional Measures:     MMT 5/5   FOTO 53  10 sit to stands with 10 #     Pain Level (0-10 scale) post treatment: 2    ASSESSMENT/Changes in Function:     Patient responds well to treatment session. No adverse effects were noted from today's treatment session. Patient is being discharged as she has met 100% of her short and long term goals. She has developed strength promoting increased activity tolerance. She has become independent with exercise and plans to participate in an independent fitness program to reduce future episodes of care. Provided HEP to promote seamless transition to independent wellness.        []  See Plan of Care  []  See progress note/recertification  [x]  See Discharge Summary         Progress towards goals / Updated goals:  Short Term Goals: To be accomplished in 2 weeks:                   QITGOLI will report compliance with HEP at least 1x/day to aid in rehabilitation program.                   Status at IE: NA                   Current: Met 08/19/2019     Long Term Goals: To be accomplished in 6 weeks:                   EFKHMSH will increase strength to 5/5 throughout B LEs to aid in completion of ADLs.                  Status at IE:4/5 in right LE                   Current: 5/5, Met 9/6/2019                      Patient will report pain less than 5/10 average to aid in completion of ADLs.                  Status at IE:6/10                   Current: Met currently 3-4/10 10/08/2019                      Patient will perform 10 pain free squats with 10lbs with good form/technique to aid in completion of ADLs.                  Status at [address] for sit<>Stand                   Current: Met 10/24/2019                      Patient will improve FOTO to 51 points overall to demonstrate improvement in functional ability.                  Status at IE:45                   Current:   Met 53  10/15/2019    PLAN  []  Upgrade activities as tolerated     []  Continue plan of care  []  Update interventions per flow sheet       [x]  Discharge due to: Goals Met  []  Other:_      Jorge Bailey PT, DPT 10/24/2019  3:14 PM    No future appointments.

## 2020-10-23 ENCOUNTER — HOSPITAL ENCOUNTER (OUTPATIENT)
Dept: PREADMISSION TESTING | Age: 60
Discharge: HOME | End: 2020-10-23
Payer: MEDICARE

## 2020-10-23 PROCEDURE — 87635 SARS-COV-2 COVID-19 AMP PRB: CPT

## 2020-10-23 NOTE — PERIOP NOTES
Left message for patient to call [phone] about required testing prior to procedure next Friday 10/30.

## 2020-10-24 LAB — SARS-COV-2, COV2NT: NOT DETECTED

## 2020-10-26 RX ORDER — DEXTROMETHORPHAN/PSEUDOEPHED 2.5-7.5/.8
1.2 DROPS ORAL
Status: CANCELLED | OUTPATIENT
Start: 2020-10-26

## 2020-10-26 RX ORDER — SODIUM CHLORIDE 0.9 % (FLUSH) 0.9 %
5-40 SYRINGE (ML) INJECTION EVERY 8 HOURS
Status: CANCELLED | OUTPATIENT
Start: 2020-10-26

## 2020-10-26 RX ORDER — DIPHENHYDRAMINE HYDROCHLORIDE 50 MG/ML
50 INJECTION, SOLUTION INTRAMUSCULAR; INTRAVENOUS ONCE
Status: CANCELLED | OUTPATIENT
Start: 2020-10-26 | End: 2020-10-26

## 2020-10-26 RX ORDER — EPINEPHRINE 0.1 MG/ML
1 INJECTION INTRACARDIAC; INTRAVENOUS
Status: CANCELLED | OUTPATIENT
Start: 2020-10-26 | End: 2020-10-27

## 2020-10-26 RX ORDER — SODIUM CHLORIDE 0.9 % (FLUSH) 0.9 %
5-40 SYRINGE (ML) INJECTION AS NEEDED
Status: CANCELLED | OUTPATIENT
Start: 2020-10-26

## 2020-10-26 RX ORDER — ATROPINE SULFATE 0.1 MG/ML
0.5 INJECTION INTRAVENOUS
Status: CANCELLED | OUTPATIENT
Start: 2020-10-26 | End: 2020-10-27

## 2020-10-30 ENCOUNTER — HOSPITAL ENCOUNTER (OUTPATIENT)
Age: 60
Setting detail: OUTPATIENT SURGERY
Discharge: HOME | End: 2020-10-30
Attending: INTERNAL MEDICINE | Admitting: INTERNAL MEDICINE
Payer: MEDICARE

## 2020-10-30 VITALS
TEMPERATURE: 97.7 F | OXYGEN SATURATION: 96 % | BODY MASS INDEX: 33.27 KG/M2 | HEART RATE: 62 BPM | WEIGHT: 180.8 LBS | DIASTOLIC BLOOD PRESSURE: 65 MMHG | RESPIRATION RATE: 16 BRPM | HEIGHT: 62 IN | SYSTOLIC BLOOD PRESSURE: 114 MMHG

## 2020-10-30 LAB — GLUCOSE BLD STRIP.AUTO-MCNC: 126 MG/DL (ref 70–110)

## 2020-10-30 PROCEDURE — 77030040361 HC SLV COMPR DVT MDII -B: Performed by: INTERNAL MEDICINE

## 2020-10-30 PROCEDURE — 74011250636 HC RX REV CODE- 250/636: Performed by: INTERNAL MEDICINE

## 2020-10-30 PROCEDURE — 2709999900 HC NON-CHARGEABLE SUPPLY: Performed by: INTERNAL MEDICINE

## 2020-10-30 PROCEDURE — G0500 MOD SEDAT ENDO SERVICE >5YRS: HCPCS | Performed by: INTERNAL MEDICINE

## 2020-10-30 PROCEDURE — 82962 GLUCOSE BLOOD TEST: CPT

## 2020-10-30 PROCEDURE — 76040000007: Performed by: INTERNAL MEDICINE

## 2020-10-30 RX ORDER — ZIPRASIDONE HYDROCHLORIDE 60 MG/1
60 CAPSULE ORAL DAILY
COMMUNITY

## 2020-10-30 RX ORDER — ACETAMINOPHEN AND CODEINE PHOSPHATE 300; 15 MG/1; MG/1
1-2 TABLET ORAL
COMMUNITY

## 2020-10-30 RX ORDER — GABAPENTIN 600 MG/1
600 TABLET ORAL 3 TIMES DAILY
COMMUNITY

## 2020-10-30 RX ORDER — ZOLPIDEM TARTRATE 10 MG/1
10 TABLET ORAL
COMMUNITY

## 2020-10-30 RX ORDER — FINGOLIMOD HCL 0.5 MG/1
1 CAPSULE ORAL DAILY
COMMUNITY

## 2020-10-30 RX ORDER — LISINOPRIL 5 MG/1
5 TABLET ORAL DAILY
COMMUNITY

## 2020-10-30 RX ORDER — LAMOTRIGINE 200 MG/1
200 TABLET ORAL DAILY
COMMUNITY

## 2020-10-30 RX ORDER — MECLIZINE HCL 12.5 MG 12.5 MG/1
12.5 TABLET ORAL
COMMUNITY

## 2020-10-30 RX ORDER — SERTRALINE HYDROCHLORIDE 100 MG/1
200 TABLET, FILM COATED ORAL DAILY
COMMUNITY

## 2020-10-30 RX ORDER — HYDROXYZINE PAMOATE 25 MG/1
25 CAPSULE ORAL 4 TIMES DAILY
COMMUNITY

## 2020-10-30 RX ORDER — SODIUM CHLORIDE 9 MG/ML
1000 INJECTION, SOLUTION INTRAVENOUS CONTINUOUS
Status: DISCONTINUED | OUTPATIENT
Start: 2020-10-30 | End: 2020-10-30 | Stop reason: HOSPADM

## 2020-10-30 RX ORDER — NALOXONE HYDROCHLORIDE 0.4 MG/ML
0.4 INJECTION, SOLUTION INTRAMUSCULAR; INTRAVENOUS; SUBCUTANEOUS
Status: DISCONTINUED | OUTPATIENT
Start: 2020-10-30 | End: 2020-10-30 | Stop reason: HOSPADM

## 2020-10-30 RX ORDER — FENTANYL CITRATE 50 UG/ML
100 INJECTION, SOLUTION INTRAMUSCULAR; INTRAVENOUS
Status: DISCONTINUED | OUTPATIENT
Start: 2020-10-30 | End: 2020-10-30 | Stop reason: HOSPADM

## 2020-10-30 RX ORDER — MIDAZOLAM HYDROCHLORIDE 1 MG/ML
.25-5 INJECTION, SOLUTION INTRAMUSCULAR; INTRAVENOUS
Status: DISCONTINUED | OUTPATIENT
Start: 2020-10-30 | End: 2020-10-30 | Stop reason: HOSPADM

## 2020-10-30 RX ORDER — MODAFINIL 200 MG/1
200 TABLET ORAL DAILY
COMMUNITY

## 2020-10-30 RX ORDER — ESOMEPRAZOLE MAGNESIUM 40 MG/1
40 CAPSULE, DELAYED RELEASE ORAL DAILY
COMMUNITY

## 2020-10-30 RX ORDER — SIMVASTATIN 20 MG/1
20 TABLET, FILM COATED ORAL
COMMUNITY

## 2020-10-30 RX ORDER — ERGOCALCIFEROL 1.25 MG/1
50000 CAPSULE ORAL
COMMUNITY

## 2020-10-30 RX ORDER — METFORMIN HYDROCHLORIDE 500 MG/1
1 TABLET, FILM COATED, EXTENDED RELEASE ORAL DAILY
COMMUNITY

## 2020-10-30 RX ORDER — FLUMAZENIL 0.1 MG/ML
0.2 INJECTION INTRAVENOUS
Status: DISCONTINUED | OUTPATIENT
Start: 2020-10-30 | End: 2020-10-30 | Stop reason: HOSPADM

## 2020-10-30 RX ADMIN — SODIUM CHLORIDE 1000 ML: 900 INJECTION, SOLUTION INTRAVENOUS at 09:40

## 2020-10-30 NOTE — H&P
ype:                 Office Visit      Assessment/Plan  # Detail Type Description    1. Assessment Abdominal pain (R10.9). 2. Assessment Epigastric pain (R10.13). Patient Plan constant epigastric pain like burning like a lump that can go up to 10/10  radiating to the back since urgent appendectomy at [facility] for acute appendectomy . The pain is increased immediately after eating but gaining weight. SHe has been diabetic x 5 to 6 years. she have 4 to 5 bm/ day rare diarrhea and rare constipation but constipation is the more frequent. She gets nausea but no vomiting. a lot of flatulence and belching. She has been taking Nexium 20 mg every morning x 3 to 4 months for severe heartburns but not controlling her symptoms as if it is not working, has to take a lot of TUMS but also it doesn't help the pain or the heartburns. has to take the Tylenol with codeine  bid. She gets the best relieves by eating Sorbets, ice cream,  from belching and passing gas. had a negative colonoscopy 5 years ago at [facility].  Had open cholecystectomy in the eighties, hysterectomy left knee surgery  CT scan Feb 10, 2020 slightly dilated CBD 6 to 8 mm. LFT and lab on Patricia 15, 2020   I would like to repeat the Ct scan as the pain according to her is excruciating and for 11 months but then she told me she had 2 more CT scan that were also negative the last being in August. Her EGD in 6 2020 showed only a tiny Hiatal hernia < 1 cm and 4 fundic type polyps nothing to explain the pain. Major papilla was normal  I don't think she has mesenteric ischemia as no weight loss plus she like to eat ice cream. for now I would like to do her colonoscopy as I discovered that her last was done in fact 16 years ago and not 5 years ago as she claimed. I told her to try to cut the Codeine as this would make her more constipated and she does feel bloated. She admit that the pain is also relieved by belching and passing gas.  tamica her also to take Miralax on regular basis as she is often constipated. I don't think she has any significant pathology. I don't like her taking Narcotics on a daily basis. She used to abuse alcohol but claim to have quit 10 years ago. she never had pancreatitis in the past plus her pancreas and lipase were normal.  She has DM for > 5 years but no CAD or CVA I explained to her the procedure of colonoscopy and the risks involved which include but not limited to reaction to sedation, bleeding, perforation, infection or missing a lesion if bowels are not well prepped or are unusually tortuous. she agreed to proceed with the procedure and answered her questions. I gave her the Suprep to clean her bowels. I advised her to take if needed extra laxatives for few days before incase she is on the constipated side to assure adequate bowel prep. Told her not take her medications in the morning of the procedure because they would be flushed out with the prep but can take them more confidently after the procedure. I advised her to bring all her medication with her. This 61year old female presents for Abdominal Pain. History of Present Illness:  1. Abdominal Pain   Onset: 9 months ago. It occurs daily. The problem is unchanged. Location is epigastric, midline and back. The patient describes it as bloating and throbbing. Symptom is aggravated by exercise, movement, bending over, food and dizziness. Additional information: Bm 3 x4 daily Last  Ct. Abdomen  2/10/2020.           PROBLEM LIST:     Problem Description Onset Date Chronic Clinical Status Notes   Gastroesophageal reflux in child 06/11/2020 N     Epigastric discomfort 06/11/2020 N               PAST MEDICAL/SURGICAL HISTORY   (Detailed)    Disease/disorder Onset Date Management Date Comments     Esophagogastroduodenoscopy with possible biopsies and polypectomies as needed           Family History  (Detailed)      Social History: (Detailed)  Tobacco use reviewed. Preferred language is [place]. MARITAL STATUS/FAMILY/SOCIAL SUPPORT  Marital status:    Tobacco use status: Current non-smoker. Smoking status: Never smoker. TOBACCO SCREENING:  Patient has never used tobacco. Patient has not used tobacco in the last 30 days. Patient has not used smokeless tobacco in the last 30 days. SMOKING STATUS  Type Smoking Status Usage Per Day Years Used Pack Years Total Pack Years    Never smoker         ALCOHOL  There is no history of alcohol use. CAFFEINE  The patient uses caffeine: coffee - 1 cup a day. Medications (active prior to today)  Medication Name Sig Description Start Date Stop Date Refilled Rx Elsewhere   simvastatin 20 mg tablet take 1 tablet by oral route  every day in the evening //   Y   ergocalciferol (vitamin D2) 1,250 mcg (50,000 unit) capsule  //   Y   modafinil 200 mg tablet take 1 tablet by oral route  every day in the morning //   Y   hydroxyzine HCl 25 mg tablet take 1 tablet by oral route 4 times every day //   Y   zolpidem 10 mg tablet take 1 tablet by oral route  every day at bedtime //   Y   lamotrigine 200 mg tablet take 1 tablet by oral route 2 times every day //   Y   sertraline 100 mg tablet take 1 tablet by oral route  every day //   Y   terbinafine HCl 1 % topical cream apply by topical route  every day to the affected and surrounding areas of skin //   Y   diclofenac 1 % topical gel apply 2 gram by topical route 4 times every day to the affected area(s) //   Y   gabapentin 300 mg capsule take 1 capsule by oral route 3 times every day //   Y   lisinopril 5 mg tablet take 1 tablet by oral route  every day //   Y     Medication Reconciliation  Medications reconciled today.   Medication Reviewed  Adherence Medication Name Sig Desc Elsewhere Status   taking as directed simvastatin 20 mg tablet take 1 tablet by oral route  every day in the evening Y Verified   taking as directed ergocalciferol (vitamin D2) 1,250 mcg (50,000 unit) capsule  Y Verified   taking as directed modafinil 200 mg tablet take 1 tablet by oral route  every day in the morning Y Verified   taking as directed hydroxyzine HCl 25 mg tablet take 1 tablet by oral route 4 times every day Y Verified   taking as directed zolpidem 10 mg tablet take 1 tablet by oral route  every day at bedtime Y Verified   taking as directed lamotrigine 200 mg tablet take 1 tablet by oral route 2 times every day Y Verified   taking as directed sertraline 100 mg tablet take 1 tablet by oral route  every day Y Verified   taking as directed terbinafine HCl 1 % topical cream apply by topical route  every day to the affected and surrounding areas of skin Y Verified   taking as directed diclofenac 1 % topical gel apply 2 gram by topical route 4 times every day to the affected area(s) Y Verified   taking as directed gabapentin 300 mg capsule take 1 capsule by oral route 3 times every day Y Verified   taking as directed lisinopril 5 mg tablet take 1 tablet by oral route  every day Y Verified     Medications (Added, Continued or Stopped today)  Start Date Medication Directions PRN Status PRN Reason Instruction Stop Date    diclofenac 1 % topical gel apply 2 gram by topical route 4 times every day to the affected area(s) N       ergocalciferol (vitamin D2) 1,250 mcg (50,000 unit) capsule  N       gabapentin 300 mg capsule take 1 capsule by oral route 3 times every day N      10/15/2020 GaviLyte-N 420 gram oral solution take as prescribed by physician N       hydroxyzine HCl 25 mg tablet take 1 tablet by oral route 4 times every day N       lamotrigine 200 mg tablet take 1 tablet by oral route 2 times every day N       lisinopril 5 mg tablet take 1 tablet by oral route  every day N      10/15/2020 metformin 500 mg tablet take 1 tablet by oral route 2 times every day with morning and evening meals N       modafinil 200 mg tablet take 1 tablet by oral route  every day in the morning N       sertraline 100 mg tablet take 1 tablet by oral route  every day N       simvastatin 20 mg tablet take 1 tablet by oral route  every day in the evening N       terbinafine HCl 1 % topical cream apply by topical route  every day to the affected and surrounding areas of skin N       zolpidem 10 mg tablet take 1 tablet by oral route  every day at bedtime N        Allergies:  Ingredient Reaction (Severity) Medication Name Comment   LATEX Rash     TRIMETHOPRIM Abdominal discomfort (moderate) Septra    Reviewed, updated. Review of System  System Neg/Pos Details   Constitutional Negative Chills, Fever, Malaise and Weight loss. ENMT Negative Ear infections, Nasal congestion, Sinus Infection and Sore throat. Eyes Negative Double vision and Eye pain. Respiratory Negative Asthma, Chronic cough, Dyspnea, Pleuritic pain and Wheezing. Cardio Negative Chest pain, Edema and Irregular heartbeat/palpitations. GI Negative Abdominal pain, Change in bowel habits, Constipation, Decreased appetite, Diarrhea, Dysphagia, Heartburn, Hematemesis, Hematochezia, Melena, Nausea, Reflux and Vomiting.  Negative Dysuria, Hematuria, Urinary frequency, Urinary incontinence and Urinary retention. Endocrine Negative Cold intolerance, Heat intolerance and Increased thirst.   Neuro Negative Dizziness, Headache, Numbness, Tremors and Vertigo. Psych Negative Anxiety, Depression and Increased stress. Integumentary Negative Hives, Pruritus and Rash. MS Negative Back pain, Joint pain and Myalgia. Hema/Lymph Negative Easy bleeding, Easy bruising and Lymphadenopathy. Allergic/Immuno Negative Chemicals in work place, Contact allergy, Food allergies, Immunosuppression and Seasonal allergies. Reproductive Negative Breast lumps, Breast pain and Vaginal discharge.      Vital Signs   Height  Time ft in cm Last Measured Height Position   2:49 PM 5.0 2.00 157.48 06/11/2020      MAP (Calculated)  Arterial Line 1 BP (mmHg) BP Patient Position  Resp  SpO2  O2 Device  O2 Flow Rate (L/min)  Pre/Post Ductal  Weight        10/30/20 0919  98.7 °F (37.1 °C)  57Abnormal    153/70Abnormal    98      16  99 %  Room air      82 kg (180 lb 12.8 oz)        PHYSICAL EXAM:  Exam Findings Details   Constitutional Normal No acute distress. Well Nourished. Well developed. Eyes Normal General - Right: Normal, Left: Normal. Conjunctiva - Right: Normal, Left: Normal. Sclera - Right: Normal, Left: Normal. Cornea - Right: Normal, Left: Normal. Pupil - Right: Normal, Left: Normal.   Nose/Mouth/Throat Normal Lips/teeth/gums - Normal. Tongue - Normal. Buccal mucosa - Normal. Palate & uvula - Normal.   Neck Exam Normal Inspection - Normal. Palpation - Normal. Thyroid gland - Normal. Cervical lymph nodes - Normal.   Respiratory Normal Inspection - Normal. Auscultation - Normal. Percussion - Normal. Cough - Absent. Effort - Normal.   Cardiovascular Normal Heart rate - Regular rate. Heart sounds - Normal S1, Normal S2. Murmurs - None. Extremities - No edema. Abdomen * CVA tenderness - Right kidney tenderness. Abdominal tenderness - mild localized epigastric. Abdomen Normal Patient is not obese. Inspection - Normal. Appliance(s) - None. Abdominal muscles - Normal. Auscultation - Normal. Percussion - Normal. Anterior palpation - No guarding, No rebound. Umbilicus - Normal. Abdominal reflexes - Normal. No hepatic enlargement. No spleen enlargement. No hernia. No ascites. No palpable mass. Carnett's sign - Normal. Espinoza's sign - Normal.   Skin Normal Inspection - Normal.   Musculoskeletal Normal Hands - Left: Normal, Right: Normal.   Extremity Normal No cyanosis. No edema. Clubbing - Absent.    Neurological Normal Level of consciousness - Normal. Orientation - Normal. Memory - Normal. Motor - Normal. Balance & gait - Normal. Coordination - Normal. Fine motor skills - Normal. DTRs - Normal.   Psychiatric Normal Orientation - Oriented to time, place, person & situation. Not anxious. Appropriate mood and affect. Behavior appropriate for age. Sufficient language. No memory loss.        No change in H&P

## 2020-10-30 NOTE — DISCHARGE INSTRUCTIONS
Susannah Rosas  [id number]  1960    COLON DISCHARGE INSTRUCTIONS    Discomfort:  Redness at IV site- apply warm compress to area; if redness or soreness persist- contact your physician  There may be a slight amount of blood passed from the rectum  Gaseous discomfort- walking, belching will help relieve any discomfort  You may not operate a vehicle til the next day. You may not engage in an occupation involving machinery or appliances til the next day. You may not drink alcoholic beverages til the next day. DIET:   High fiber diet. ACTIVITY:  You may not  resume your normal daily activities til the next day. it is recommended that you spend the remainder of the day resting -  avoid any strenuous activity. CALL M.D.  IF ANY SIGN OF:   Increasing pain, nausea, vomiting  Abdominal distension (swelling)  New increased bleeding (oral or rectal)  Fever (chills)  Pain in chest area  Bloody discharge from nose or mouth  Shortness of breath    You   DISCHARGE SUMMARY from Nurse    PATIENT INSTRUCTIONS:    After general anesthesia or intravenous sedation, for 24 hours or while taking prescription Narcotics:  · Limit your activities  · Do not drive and operate hazardous machinery  · Do not make important personal or business decisions  · Do  not drink alcoholic beverages  · If you have not urinated within 8 hours after discharge, please contact your surgeon on call. Report the following to your surgeon:  · Excessive pain, swelling, redness or odor of or around the surgical area  · Temperature over 100.5  · Nausea and vomiting lasting longer than 4 hours or if unable to take medications  · Any signs of decreased circulation or nerve impairment to extremity: change in color, persistent  numbness, tingling, coldness or increase pain  · Any questions    What to do at Home:  Recommended activity: as above     If you experience any of the following symptoms as above , please follow up with Doctor Kristin Cyr.     * Please give a list of your current medications to your Primary Care Provider. *  Please update this list whenever your medications are discontinued, doses are      changed, or new medications (including over-the-counter products) are added. *  Please carry medication information at all times in case of emergency situations. These are general instructions for a healthy lifestyle:    No smoking/ No tobacco products/ Avoid exposure to second hand smoke  Surgeon General's Warning:  Quitting smoking now greatly reduces serious risk to your health. Obesity, smoking, and sedentary lifestyle greatly increases your risk for illness    A healthy diet, regular physical exercise & weight monitoring are important for maintaining a healthy lifestyle    You may be retaining fluid if you have a history of heart failure or if you experience any of the following symptoms:  Weight gain of 3 pounds or more overnight or 5 pounds in a week, increased swelling in our hands or feet or shortness of breath while lying flat in bed. Please call your doctor as soon as you notice any of these symptoms; do not wait until your next office visit. The discharge information has been reviewed with the patient and spouse. The patient and spouse verbalized understanding. Discharge medications reviewed with the patient and spouse and appropriate educational materials and side effects teaching were provided. __________________________________________________________________________________________________________________________May  take any Advil, Aspirin, Ibuprofen, Motrin, Aleve, or Goodys for preferably Tylenol as needed for pain. Post procedure diagnosis:  Weak anal sphincter tone and small internal hemorrhoids. Follow-up Instructions: Your follow up colonoscopy will be in 10 years. Make a fu appointment.     Louann Hart MD  October 30, 2020   Patient armband removed and shreddedPatient Education Learning How To Care for Someone Who Has COVID-19  Things to know  Most people who get COVID-19 will recover with time and home care. Here are some things to know if you're caring for someone who's sick. · Treat the symptoms. Common symptoms include a fever, coughing, and feeling short of breath. Urge the person to get extra rest and drink plenty of fluids to replace fluids lost from fever. To reduce a fever, offer acetaminophen (such as Tylenol). It may also help with muscle aches. Read and follow all instructions on the label. · Watch for signs that the illness is getting worse. The person may need medical care if they're getting sicker (for example, if it's hard to breathe). But call the doctor's office before you go. They can tell you what to do. Call 911 or emergency services if the person has any of these symptoms:  ? Severe trouble breathing or shortness of breath. ? Constant pain or pressure in their chest.  ? Confusion, or trouble thinking clearly. ? A blue tint to their lips or face. Some people are more likely to get very sick and need medical care. Call the doctor as soon as symptoms start if the person you're caring for is over 72, smokes, or has a serious health problem, like asthma, heart disease, diabetes, or an immune system problem. · Protect yourself and others. The virus spreads easily from person to person, so take extra care to avoid catching or spreading the infection. ? Keep the sick person away from others as much as you can. § Have the person stay in one room. If you can, give them their own bathroom to use. § Have only one person take care of them. Keep other people--and pets--out of the sickroom. § Have the person wear a cloth face cover around other people. This includes when anyone is in the room with them or if they leave their room (for example, to go to the bathroom).  If the face cover makes it harder for the sick person to breathe, the other person should wear a face cover. § Don't share personal items. These include dishes, cups, towels, and bedding. ? Wash your hands often and well. Use soap and water, and scrub for at least 20 seconds. This is especially important after you've been around the sick person or touched things they've touched. If soap and water aren't handy, use a hand  with at least 60% alcohol. ? Avoid touching your mouth, nose, and eyes. ? Take care with the person's laundry. It's okay to wash the sick person's laundry with yours. If you have them, wear disposable gloves when handling their dirty laundry, and wash your hands well after you touch it. Wash items in the warmest water allowed for the fabric type, and dry them completely. ? Clean high-touch items every day and anytime the sick person touches them. These include doorknobs, light switches, toilets, counters, and remote controls. Use a household disinfectant or a homemade bleach solution. (Follow the directions on the label.) If the sick person has their own room, have them disinfect it every day. ? Limit visitors to your home. To help protect family and friends, stay in touch with them only by phone or computer. Current as of: July 10, 2020               Content Version: 12.6  © 4672-5800 [employer]. Care instructions adapted under license by [employer] (which disclaims liability or warranty for this information). If you have questions about a medical condition or this instruction, always ask your healthcare professional. [place] [address] any warranty or liability for your use of this information.

## 2020-10-30 NOTE — PROCEDURES
[facility]  Colonoscopy Procedure Report  _______________________________________________________  Patient: Zoya Zelaya                                        Attending Physician: Candy Mart MD    Patient ID: [id number]                                    Referring Physician: Mike Nguyen MD    Exam Date: 10/30/2020      Introduction: A  61 y.o. female patient, presents for inpatient Colonoscopy    Indications:  She had a negative colonoscopy 16 years ago. Codeine as this would make her more constipated and she does feel bloated. She admit that the pain is also relieved by belching and passing gas. She denied being constipated. She claims that she has 2 to 3 bm daily. She has no family history of cancer. She has been having severe and even excurciating epigastric pain radiating to the back since February 2020. The pain is increased immediately after every meal or food. No weight loss. She never had pancreatitis in the past plus her pancreas and lipase were normal. She has been already on Nexium 40 mg  Daily. She has DM for > 5 years but no CAD or CVA. Appendectomy, total hysterectomy, cholecystectomy. Apparently she had 3 CT scan of the abdomen and pelvis within this year at [facility]. The last being in July and were all negative for any significant pathology. She doesn't smoke or drink alcohol. She quit drinking ~ 10 years ago. Consent: The benefits, risks, and alternatives to the procedure were discussed and informed consent was obtained from the patient. Preparation: EKG, pulse, pulse oximetry and blood pressure were monitored throughout the procedure. ASA Classification: Class II- . The heart is an S1-S2 and regular heart rate and rhythm. Lungs are clear to auscultation and percussion. Abdomen is soft, nondistended, and nontender. Mental Status: awake, alert, and oriented to person, place, and time    Medications:  · Fentanyl 100 mcg IV before procedure.   · Versed 5 mg IV throughout the procedure. Rectal Exam: Slightly decreased anal sphincter tone. No Blood. Pathology Specimens:  0    Procedure: The colonoscope was passed with ease through the anus under direct visualization and advanced to the cecum. The patient required positioning on the back to aid in the passage of the scope. The scope was withdrawn and the mucosa was carefully examined. The quality of the preparation was fair in the right colon. The views were very good but good in the right colon. The patient's toleration of the procedure was excellent. The exam was done twice to the cecum. Total time is 19 minutes and withdrawal time is 14 minutes. Findings:    Rectum:   Small internal hemorrhoids. Sigmoid:   normal   Descending Colon:   Normal   Transverse Colon:   Normal   Ascending Colon:   Normal   Cecum:   Normal appendix previously removed  Terminal Ileum:   Not entered      Unplanned Events: There were no unplanned events. Estimated Blood Loss: None  Impressions:    Small internal hemorrhoids. Slightly tortuous sigmoid colon. Normal Mucosa. No blood, diverticulum, polyps or AVM found. Complications: None; patient tolerated the procedure well. Recommendations:  · Discharge home when standard parameters are met. · Resume a high fiber diet. · Resume own medications. Avoid narcotics if possible  · Colonoscopy recommendation in 10 years. · Take Miralax and/ or Colace 100 mg on regular basis if constipated. · Has to make a fu appointment to pursue the investigation regarding the abdominal pain.      Procedure Codes:    · COLONOSCOPY [[id number] (Type: Custom)]    Endoscope Information:  Model Number(s)    [id number]   Assistant: None  Signed By: Gato Turcios MD Date: 10/30/2020

## 2021-05-27 ENCOUNTER — TRANSCRIBE ORDER (OUTPATIENT)
Dept: SCHEDULING | Age: 61
End: 2021-05-27

## 2021-05-27 DIAGNOSIS — M79.89 HAND SWELLING: Primary | ICD-10-CM

## 2021-06-07 ENCOUNTER — HOSPITAL ENCOUNTER (OUTPATIENT)
Dept: MRI IMAGING | Age: 61
Discharge: HOME | End: 2021-06-07
Attending: STUDENT IN AN ORGANIZED HEALTH CARE EDUCATION/TRAINING PROGRAM
Payer: MEDICARE

## 2021-06-07 DIAGNOSIS — M79.89 HAND SWELLING: ICD-10-CM

## 2021-06-07 PROCEDURE — 73218 MRI UPPER EXTREMITY W/O DYE: CPT

## 2021-09-22 ENCOUNTER — TRANSCRIBE ORDER (OUTPATIENT)
Dept: SCHEDULING | Age: 61
End: 2021-09-22

## 2021-09-22 DIAGNOSIS — R10.13 ABDOMINAL PAIN, EPIGASTRIC: Primary | ICD-10-CM

## 2021-10-12 ENCOUNTER — HOSPITAL ENCOUNTER (OUTPATIENT)
Dept: NUCLEAR MEDICINE | Age: 61
Discharge: HOME | End: 2021-10-12
Attending: INTERNAL MEDICINE
Payer: MEDICARE

## 2021-10-12 DIAGNOSIS — R10.13 ABDOMINAL PAIN, EPIGASTRIC: ICD-10-CM

## 2021-10-12 RX ORDER — TECHNETIUM TC 99M SULFUR COLLOID 2 MG
1 KIT MISCELLANEOUS
Status: COMPLETED | OUTPATIENT
Start: 2021-10-12 | End: 2021-10-12

## 2021-10-12 RX ADMIN — TECHNETIUM TC 99M SULFUR COLLOID 1 MILLICURIE: KIT at 11:00

## 2024-07-22 ENCOUNTER — APPOINTMENT (OUTPATIENT)
Facility: HOSPITAL | Age: 64
End: 2024-07-22
Payer: MEDICARE

## 2024-07-22 ENCOUNTER — HOSPITAL ENCOUNTER (EMERGENCY)
Facility: HOSPITAL | Age: 64
Discharge: HOME | End: 2024-07-22
Payer: MEDICARE

## 2024-07-22 VITALS
DIASTOLIC BLOOD PRESSURE: 68 MMHG | OXYGEN SATURATION: 98 % | RESPIRATION RATE: 16 BRPM | SYSTOLIC BLOOD PRESSURE: 174 MMHG | HEIGHT: 62 IN | WEIGHT: 165 LBS | HEART RATE: 67 BPM | BODY MASS INDEX: 30.36 KG/M2 | TEMPERATURE: 98.7 F

## 2024-07-22 DIAGNOSIS — S20.212A CONTUSION OF LEFT CHEST WALL, INITIAL ENCOUNTER: ICD-10-CM
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
## 2024-07-22 DIAGNOSIS — S70.02XA CONTUSION OF LEFT HIP, INITIAL ENCOUNTER: ICD-10-CM

## 2024-07-22 PROCEDURE — 96372 THER/PROPH/DIAG INJ SC/IM: CPT

## 2024-07-22 PROCEDURE — 99284 EMERGENCY DEPT VISIT MOD MDM: CPT

## 2024-07-22 PROCEDURE — 6360000002 HC RX W HCPCS: Performed by: PHYSICIAN ASSISTANT

## 2024-07-22 PROCEDURE — 73502 X-RAY EXAM HIP UNI 2-3 VIEWS: CPT

## 2024-07-22 PROCEDURE — 71101 X-RAY EXAM UNILAT RIBS/CHEST: CPT

## 2024-07-22 RX ORDER — KETOROLAC TROMETHAMINE 10 MG/1
10 TABLET, FILM COATED ORAL EVERY 6 HOURS PRN
Qty: 20 TABLET | Refills: 0 | Status: SHIPPED | OUTPATIENT
Start: 2024-07-22

## 2024-07-22 RX ORDER — KETOROLAC TROMETHAMINE 15 MG/ML
15 INJECTION, SOLUTION INTRAMUSCULAR; INTRAVENOUS
Status: COMPLETED | OUTPATIENT
Start: 2024-07-22 | End: 2024-07-22

## 2024-07-22 RX ADMIN — KETOROLAC TROMETHAMINE 15 MG: 15 INJECTION, SOLUTION INTRAMUSCULAR; INTRAVENOUS at 17:32

## 2024-07-22 NOTE — ED TRIAGE NOTES
Patient reports fall two days ago, fell on left side of body, c/o left sided back pain. Patient walks with cane normally.

## 2024-07-22 NOTE — ED PROVIDER NOTES
[facility] EMERGENCY DEPT  EMERGENCY DEPARTMENT ENCOUNTER         Pt Name: Samira Hernandez  MRN: [id number]  Birthdate 1960  Date of evaluation: 2024  Provider: Stephy Romero PA-C   PCP: No primary care provider on file.  Note Started: 3:35 PM 24     CHIEF COMPLAINT       Chief Complaint   Patient presents with    Fall    Back Pain        HISTORY OF PRESENT ILLNESS: 1 or more elements      History From: Patient and Patient's Daughter  HPI Limitations: none     Samira Hernandez is a 64 y.o. female who presents to the emergency department with a chief complaint of left-sided pain, left-sided back pain, left rib pain, left hip pain onset 3 days ago status post ground-level fall.  Patient reports that she normally ambulates with a cane due to a history of MS.  She tripped over a rug in her home and fell directly onto her left side.  She denies head injury or loss of consciousness.  Has been taking Tylenol and ibuprofen for pain.  She has a history of psoriatic arthritis as well     Nursing Notes were all reviewed and agreed with or any disagreements were addressed in the HPI.    PAST HISTORY     Past Medical History:  Past Medical History:   Diagnosis Date    Autoimmune disease (HCC)     MS    Diabetes (HCC)     GERD (gastroesophageal reflux disease)     Psychiatric disorder     depression and anxiety       Past Surgical History:  Past Surgical History:   Procedure Laterality Date    APPENDECTOMY      CHOLECYSTECTOMY, OPEN      HYSTERECTOMY      KNEE ARTHROSCOPY      clean out calcium from patella    UPPER GASTROINTESTINAL ENDOSCOPY         Family History:  No family history on file.    Social History:  Social History     Socioeconomic History    Marital status:    Tobacco Use    Smoking status: Former     Current packs/day: 0.00     Types: Cigarettes     Quit date: 10/30/2010     Years since quittin.7    Smokeless tobacco: Never   Substance and Sexual Activity    Alcohol use: Not Currently    Drug use:

## (undated) DEVICE — GARMENT,MEDLINE,DVT,INT,CALF,MED, GEN2: Brand: MEDLINE

## (undated) DEVICE — CATH SUC CTRL PRT TRIFLO 14FR --

## (undated) DEVICE — TUBING, SUCTION, 1/4" X 12', STRAIGHT: Brand: MEDLINE

## (undated) DEVICE — TRAP SPEC COLL POLYP POLYSTYR --

## (undated) DEVICE — WRISTBAND ID AD W2.5XL9.5CM RED VYN ADH CLSR UNI-PRINT

## (undated) DEVICE — SYR 3ML LL TIP 1/10ML GRAD --

## (undated) DEVICE — SPONGE GZ W4XL4IN COT 12 PLY TYP VII WVN C FLD DSGN

## (undated) DEVICE — KENDALL RADIOLUCENT FOAM MONITORING ELECTRODE RECTANGULAR SHAPE: Brand: KENDALL

## (undated) DEVICE — SPONGE GZ W4XL4IN RAYON POLY 4 PLY NONWOVEN FASTER WICKING

## (undated) DEVICE — SINGLE PORT MANIFOLD: Brand: NEPTUNE 2

## (undated) DEVICE — CANNULA CUSH AD W/ 14FT TBG

## (undated) DEVICE — SET ADMIN 16ML TBNG L100IN 2 Y INJ SITE IV PIGGY BK DISP

## (undated) DEVICE — SYRINGE 50ML E/T

## (undated) DEVICE — MAJ-1414 SINGLE USE ADPATER BIOPSY VALV: Brand: SINGLE USE ADAPTOR BIOPSY VALVE

## (undated) DEVICE — NDL PRT INJ NSAF BLNT 18GX1.5 --

## (undated) DEVICE — TOURNIQUET PHLEB W1XL18IN BLU FLAT RL AND BND REUSE FOR IV

## (undated) DEVICE — CATH IV SAFE STR 22GX1IN BLU -- PROTECTIV PLUS

## (undated) DEVICE — STRAP,POSITIONING,KNEE/BODY,FOAM,4X60": Brand: MEDLINE

## (undated) DEVICE — SOLUTION IV 500ML 0.9% SOD CHL FLX CONT

## (undated) DEVICE — NDL FLTR TIP 5 MIC 18GX1.5IN --

## (undated) DEVICE — SYR 5ML 1/5 GRAD LL NSAF LF --